# Patient Record
Sex: MALE | Race: OTHER | NOT HISPANIC OR LATINO | URBAN - METROPOLITAN AREA
[De-identification: names, ages, dates, MRNs, and addresses within clinical notes are randomized per-mention and may not be internally consistent; named-entity substitution may affect disease eponyms.]

---

## 2020-02-12 ENCOUNTER — INPATIENT (INPATIENT)
Facility: HOSPITAL | Age: 80
LOS: 6 days | Discharge: HOME | DRG: 166 | End: 2020-02-19
Attending: INTERNAL MEDICINE | Admitting: THORACIC SURGERY (CARDIOTHORACIC VASCULAR SURGERY)
Payer: MEDICARE

## 2020-02-12 VITALS
HEART RATE: 98 BPM | OXYGEN SATURATION: 96 % | DIASTOLIC BLOOD PRESSURE: 63 MMHG | SYSTOLIC BLOOD PRESSURE: 142 MMHG | RESPIRATION RATE: 18 BRPM

## 2020-02-12 DIAGNOSIS — Z95.828 PRESENCE OF OTHER VASCULAR IMPLANTS AND GRAFTS: Chronic | ICD-10-CM

## 2020-02-12 DIAGNOSIS — E11.59 TYPE 2 DIABETES MELLITUS WITH OTHER CIRCULATORY COMPLICATIONS: ICD-10-CM

## 2020-02-12 DIAGNOSIS — J43.0 UNILATERAL PULMONARY EMPHYSEMA [MACLEOD'S SYNDROME]: ICD-10-CM

## 2020-02-12 DIAGNOSIS — R91.8 OTHER NONSPECIFIC ABNORMAL FINDING OF LUNG FIELD: ICD-10-CM

## 2020-02-12 DIAGNOSIS — S88.919A COMPLETE TRAUMATIC AMPUTATION OF UNSPECIFIED LOWER LEG, LEVEL UNSPECIFIED, INITIAL ENCOUNTER: Chronic | ICD-10-CM

## 2020-02-12 DIAGNOSIS — I50.32 CHRONIC DIASTOLIC (CONGESTIVE) HEART FAILURE: ICD-10-CM

## 2020-02-12 DIAGNOSIS — I73.9 PERIPHERAL VASCULAR DISEASE, UNSPECIFIED: ICD-10-CM

## 2020-02-12 DIAGNOSIS — Z95.5 PRESENCE OF CORONARY ANGIOPLASTY IMPLANT AND GRAFT: Chronic | ICD-10-CM

## 2020-02-12 DIAGNOSIS — I10 ESSENTIAL (PRIMARY) HYPERTENSION: ICD-10-CM

## 2020-02-12 DIAGNOSIS — Z98.890 OTHER SPECIFIED POSTPROCEDURAL STATES: Chronic | ICD-10-CM

## 2020-02-12 DIAGNOSIS — I25.10 ATHEROSCLEROTIC HEART DISEASE OF NATIVE CORONARY ARTERY WITHOUT ANGINA PECTORIS: ICD-10-CM

## 2020-02-12 PROBLEM — Z00.00 ENCOUNTER FOR PREVENTIVE HEALTH EXAMINATION: Status: ACTIVE | Noted: 2020-02-12

## 2020-02-12 LAB
ALBUMIN SERPL ELPH-MCNC: 3.4 G/DL — SIGNIFICANT CHANGE UP (ref 3.3–5)
ALP SERPL-CCNC: 81 U/L — SIGNIFICANT CHANGE UP (ref 40–120)
ALT FLD-CCNC: 40 U/L — SIGNIFICANT CHANGE UP (ref 10–45)
ANION GAP SERPL CALC-SCNC: 15 MMOL/L — SIGNIFICANT CHANGE UP (ref 5–17)
APPEARANCE UR: CLEAR — SIGNIFICANT CHANGE UP
APTT BLD: 31.5 SEC — SIGNIFICANT CHANGE UP (ref 27.5–36.3)
AST SERPL-CCNC: 36 U/L — SIGNIFICANT CHANGE UP (ref 10–40)
BASE EXCESS BLDA CALC-SCNC: 3.6 MMOL/L — HIGH (ref -2–3)
BILIRUB SERPL-MCNC: 0.3 MG/DL — SIGNIFICANT CHANGE UP (ref 0.2–1.2)
BILIRUB UR-MCNC: NEGATIVE — SIGNIFICANT CHANGE UP
BLD GP AB SCN SERPL QL: NEGATIVE — SIGNIFICANT CHANGE UP
BUN SERPL-MCNC: 37 MG/DL — HIGH (ref 7–23)
CALCIUM SERPL-MCNC: 8.2 MG/DL — LOW (ref 8.4–10.5)
CHLORIDE SERPL-SCNC: 100 MMOL/L — SIGNIFICANT CHANGE UP (ref 96–108)
CHOLEST SERPL-MCNC: 97 MG/DL — SIGNIFICANT CHANGE UP (ref 10–199)
CO2 SERPL-SCNC: 26 MMOL/L — SIGNIFICANT CHANGE UP (ref 22–31)
COLOR SPEC: YELLOW — SIGNIFICANT CHANGE UP
CREAT SERPL-MCNC: 1.67 MG/DL — HIGH (ref 0.5–1.3)
DIFF PNL FLD: ABNORMAL
GLUCOSE SERPL-MCNC: 209 MG/DL — HIGH (ref 70–99)
GLUCOSE UR QL: NEGATIVE — SIGNIFICANT CHANGE UP
HBA1C BLD-MCNC: 7.6 % — HIGH (ref 4–5.6)
HCO3 BLDA-SCNC: 28 MMOL/L — SIGNIFICANT CHANGE UP (ref 21–28)
HCT VFR BLD CALC: 41 % — SIGNIFICANT CHANGE UP (ref 39–50)
HDLC SERPL-MCNC: 59 MG/DL — SIGNIFICANT CHANGE UP
HGB BLD-MCNC: 13.5 G/DL — SIGNIFICANT CHANGE UP (ref 13–17)
INR BLD: 1.08 — SIGNIFICANT CHANGE UP (ref 0.88–1.16)
KETONES UR-MCNC: ABNORMAL MG/DL
LEUKOCYTE ESTERASE UR-ACNC: NEGATIVE — SIGNIFICANT CHANGE UP
LIPID PNL WITH DIRECT LDL SERPL: 19 MG/DL — SIGNIFICANT CHANGE UP
MAGNESIUM SERPL-MCNC: 2 MG/DL — SIGNIFICANT CHANGE UP (ref 1.6–2.6)
MCHC RBC-ENTMCNC: 32.2 PG — SIGNIFICANT CHANGE UP (ref 27–34)
MCHC RBC-ENTMCNC: 32.9 GM/DL — SIGNIFICANT CHANGE UP (ref 32–36)
MCV RBC AUTO: 97.9 FL — SIGNIFICANT CHANGE UP (ref 80–100)
NITRITE UR-MCNC: NEGATIVE — SIGNIFICANT CHANGE UP
NRBC # BLD: 0 /100 WBCS — SIGNIFICANT CHANGE UP (ref 0–0)
NT-PROBNP SERPL-SCNC: 410 PG/ML — HIGH (ref 0–300)
PCO2 BLDA: 43 MMHG — SIGNIFICANT CHANGE UP (ref 35–48)
PH BLDA: 7.44 — SIGNIFICANT CHANGE UP (ref 7.35–7.45)
PH UR: 6 — SIGNIFICANT CHANGE UP (ref 5–8)
PHOSPHATE SERPL-MCNC: 2.2 MG/DL — LOW (ref 2.5–4.5)
PLATELET # BLD AUTO: 187 K/UL — SIGNIFICANT CHANGE UP (ref 150–400)
PO2 BLDA: 41 MMHG — CRITICAL LOW (ref 83–108)
POTASSIUM SERPL-MCNC: 3.4 MMOL/L — LOW (ref 3.5–5.3)
POTASSIUM SERPL-SCNC: 3.4 MMOL/L — LOW (ref 3.5–5.3)
PROT SERPL-MCNC: 7 G/DL — SIGNIFICANT CHANGE UP (ref 6–8.3)
PROT UR-MCNC: 100 MG/DL
PROTHROM AB SERPL-ACNC: 12.3 SEC — SIGNIFICANT CHANGE UP (ref 10–12.9)
RBC # BLD: 4.19 M/UL — LOW (ref 4.2–5.8)
RBC # FLD: 13.2 % — SIGNIFICANT CHANGE UP (ref 10.3–14.5)
RH IG SCN BLD-IMP: POSITIVE — SIGNIFICANT CHANGE UP
SAO2 % BLDA: 75 % — LOW (ref 95–100)
SODIUM SERPL-SCNC: 141 MMOL/L — SIGNIFICANT CHANGE UP (ref 135–145)
SP GR SPEC: 1.02 — SIGNIFICANT CHANGE UP (ref 1–1.03)
T4 AB SER-ACNC: 8.34 UG/DL — SIGNIFICANT CHANGE UP (ref 3.17–11.72)
TOTAL CHOLESTEROL/HDL RATIO MEASUREMENT: 1.6 RATIO — LOW (ref 3.4–9.6)
TRIGL SERPL-MCNC: 93 MG/DL — SIGNIFICANT CHANGE UP (ref 10–149)
TROPONIN T SERPL-MCNC: 0.05 NG/ML — CRITICAL HIGH (ref 0–0.01)
TSH SERPL-MCNC: 0.86 UIU/ML — SIGNIFICANT CHANGE UP (ref 0.35–4.94)
UROBILINOGEN FLD QL: 0.2 E.U./DL — SIGNIFICANT CHANGE UP
WBC # BLD: 10.4 K/UL — SIGNIFICANT CHANGE UP (ref 3.8–10.5)
WBC # FLD AUTO: 10.4 K/UL — SIGNIFICANT CHANGE UP (ref 3.8–10.5)

## 2020-02-12 PROCEDURE — 71045 X-RAY EXAM CHEST 1 VIEW: CPT | Mod: 26

## 2020-02-12 RX ORDER — BUDESONIDE, MICRONIZED 100 %
0.5 POWDER (GRAM) MISCELLANEOUS
Refills: 0 | Status: DISCONTINUED | OUTPATIENT
Start: 2020-02-12 | End: 2020-02-19

## 2020-02-12 RX ORDER — DEXTROSE 50 % IN WATER 50 %
12.5 SYRINGE (ML) INTRAVENOUS ONCE
Refills: 0 | Status: DISCONTINUED | OUTPATIENT
Start: 2020-02-12 | End: 2020-02-19

## 2020-02-12 RX ORDER — AMLODIPINE BESYLATE 2.5 MG/1
5 TABLET ORAL DAILY
Refills: 0 | Status: DISCONTINUED | OUTPATIENT
Start: 2020-02-12 | End: 2020-02-18

## 2020-02-12 RX ORDER — POTASSIUM CHLORIDE 20 MEQ
40 PACKET (EA) ORAL ONCE
Refills: 0 | Status: COMPLETED | OUTPATIENT
Start: 2020-02-12 | End: 2020-02-12

## 2020-02-12 RX ORDER — DEXTROSE 50 % IN WATER 50 %
25 SYRINGE (ML) INTRAVENOUS ONCE
Refills: 0 | Status: DISCONTINUED | OUTPATIENT
Start: 2020-02-12 | End: 2020-02-19

## 2020-02-12 RX ORDER — ASPIRIN/CALCIUM CARB/MAGNESIUM 324 MG
81 TABLET ORAL DAILY
Refills: 0 | Status: DISCONTINUED | OUTPATIENT
Start: 2020-02-12 | End: 2020-02-19

## 2020-02-12 RX ORDER — CLOPIDOGREL BISULFATE 75 MG/1
1 TABLET, FILM COATED ORAL
Qty: 0 | Refills: 0 | DISCHARGE

## 2020-02-12 RX ORDER — FAMOTIDINE 10 MG/ML
20 INJECTION INTRAVENOUS DAILY
Refills: 0 | Status: DISCONTINUED | OUTPATIENT
Start: 2020-02-12 | End: 2020-02-19

## 2020-02-12 RX ORDER — CHLORHEXIDINE GLUCONATE 213 G/1000ML
1 SOLUTION TOPICAL ONCE
Refills: 0 | Status: COMPLETED | OUTPATIENT
Start: 2020-02-12 | End: 2020-02-12

## 2020-02-12 RX ORDER — INSULIN LISPRO 100/ML
VIAL (ML) SUBCUTANEOUS
Refills: 0 | Status: DISCONTINUED | OUTPATIENT
Start: 2020-02-12 | End: 2020-02-19

## 2020-02-12 RX ORDER — SODIUM CHLORIDE 9 MG/ML
3 INJECTION INTRAMUSCULAR; INTRAVENOUS; SUBCUTANEOUS EVERY 8 HOURS
Refills: 0 | Status: DISCONTINUED | OUTPATIENT
Start: 2020-02-12 | End: 2020-02-19

## 2020-02-12 RX ORDER — SODIUM CHLORIDE 9 MG/ML
1000 INJECTION, SOLUTION INTRAVENOUS
Refills: 0 | Status: DISCONTINUED | OUTPATIENT
Start: 2020-02-12 | End: 2020-02-19

## 2020-02-12 RX ORDER — FAMOTIDINE 10 MG/ML
1 INJECTION INTRAVENOUS
Qty: 0 | Refills: 0 | DISCHARGE

## 2020-02-12 RX ORDER — DEXTROSE 50 % IN WATER 50 %
15 SYRINGE (ML) INTRAVENOUS ONCE
Refills: 0 | Status: DISCONTINUED | OUTPATIENT
Start: 2020-02-12 | End: 2020-02-19

## 2020-02-12 RX ORDER — GLUCAGON INJECTION, SOLUTION 0.5 MG/.1ML
1 INJECTION, SOLUTION SUBCUTANEOUS ONCE
Refills: 0 | Status: DISCONTINUED | OUTPATIENT
Start: 2020-02-12 | End: 2020-02-19

## 2020-02-12 RX ORDER — ATORVASTATIN CALCIUM 80 MG/1
20 TABLET, FILM COATED ORAL AT BEDTIME
Refills: 0 | Status: DISCONTINUED | OUTPATIENT
Start: 2020-02-12 | End: 2020-02-13

## 2020-02-12 RX ORDER — METOPROLOL TARTRATE 50 MG
12.5 TABLET ORAL EVERY 12 HOURS
Refills: 0 | Status: DISCONTINUED | OUTPATIENT
Start: 2020-02-12 | End: 2020-02-13

## 2020-02-12 RX ORDER — HEPARIN SODIUM 5000 [USP'U]/ML
5000 INJECTION INTRAVENOUS; SUBCUTANEOUS EVERY 8 HOURS
Refills: 0 | Status: DISCONTINUED | OUTPATIENT
Start: 2020-02-12 | End: 2020-02-19

## 2020-02-12 RX ORDER — CHLORHEXIDINE GLUCONATE 213 G/1000ML
1 SOLUTION TOPICAL ONCE
Refills: 0 | Status: COMPLETED | OUTPATIENT
Start: 2020-02-13 | End: 2020-02-13

## 2020-02-12 RX ORDER — NICOTINE POLACRILEX 2 MG
1 GUM BUCCAL
Qty: 0 | Refills: 0 | DISCHARGE

## 2020-02-12 RX ORDER — SODIUM CHLORIDE 9 MG/ML
500 INJECTION, SOLUTION INTRAVENOUS
Refills: 0 | Status: DISCONTINUED | OUTPATIENT
Start: 2020-02-12 | End: 2020-02-15

## 2020-02-12 RX ORDER — CHLORHEXIDINE GLUCONATE 213 G/1000ML
10 SOLUTION TOPICAL ONCE
Refills: 0 | Status: DISCONTINUED | OUTPATIENT
Start: 2020-02-13 | End: 2020-02-19

## 2020-02-12 RX ORDER — ASPIRIN/CALCIUM CARB/MAGNESIUM 324 MG
1 TABLET ORAL
Qty: 0 | Refills: 0 | DISCHARGE

## 2020-02-12 RX ORDER — ALBUTEROL 90 UG/1
2 AEROSOL, METERED ORAL EVERY 4 HOURS
Refills: 0 | Status: DISCONTINUED | OUTPATIENT
Start: 2020-02-12 | End: 2020-02-19

## 2020-02-12 RX ADMIN — Medication 0.5 MILLIGRAM(S): at 21:29

## 2020-02-12 NOTE — H&P ADULT - NSHPPHYSICALEXAM_GEN_ALL_CORE
Vital Signs Last 24 Hrs  T(C): 36.7 (12 Feb 2020 21:17), Max: 36.7 (12 Feb 2020 21:17)  T(F): 98.1 (12 Feb 2020 21:17), Max: 98.1 (12 Feb 2020 21:17)  HR: 98 (12 Feb 2020 21:28) (98 - 98)  BP: 124/61 (12 Feb 2020 21:28) (124/61 - 142/63)  BP(mean): 87 (12 Feb 2020 21:28) (87 - 90)  RR: 18 (12 Feb 2020 21:28) (18 - 18)  SpO2: 96% (12 Feb 2020 21:28) (96% - 96%)    Physical Exam  CONSTITUTIONAL:                                                              smokers cough  NEURO:                                                                       WNL                      EYES:                                                                                WNL  ENMT:                                                                               WNL  CV:                                                                                   NSR with PVC  RESPIRATORY:                                                                 severe rhonci right lung with basilar wheezing bilateral; slight use of accessory muscles on respiration  GI:                                                                                     WNL  : CASTRO + / -                                                                  WNL  MUSCULOSKELETAL:                                                       WNL  SKIN / BREAST:                                                                  surgical scar on abdomen healed  EXTREMITIES:                                                                  right above knee amputation; poor bilateral radial pulses audible in doppler; good femoral pulses

## 2020-02-12 NOTE — H&P ADULT - ASSESSMENT
80 y/o current smoker 1-2 PPD x 67 years  male PMH COPD PVD, s/p Right below knee amputation ,s/p fem/pop bypass bilateral?, diastolic CHF, CAD, PCI 2005, DMII (insulin pump) who for the last week c/o SOB. Then most recently while home experienced SOB along with chest pain. Taken to [facility] ED by ambulance where he was admitted for community acquired pneumonia. CT scan showed right sided middle lobe lung mass suspicious for malignancy and was also diagnosed with NSTEMI. Under went stress testing which was positive. Cardiac cath demonstrated Mid LAD stenosis 70%

## 2020-02-12 NOTE — H&P ADULT - NSICDXPASTSURGICALHX_GEN_ALL_CORE_FT
PAST SURGICAL HISTORY:  Amputation of leg     H/O exploratory laparotomy     H/O heart artery stent     S/P femoral-popliteal bypass surgery

## 2020-02-12 NOTE — H&P ADULT - HISTORY OF PRESENT ILLNESS
80 y/o current smoker 1-2 PPD x 67 years  male PMH COPD PVD, s/p Right below knee amputation ,s/p fem/pop bypass bilateral?, diastolic CHF, CAD, PCI 2005, DMII (insulin pump) who for the last week c/o SOB. Then most recently while home experienced SOB along with chest pain. Taken to [facility] ED by ambulance where he was admitted for community acquired pneumonia. CT scan showed right sided middle lobe lung mass suspicious for malignancy and was also diagnosed with NSTEMI. Under went stress testing which was positive. Cardiac cath demonstrated Mid LAD stenosis 70%. PT transferred to [facility] under Dr. King Saez for evaluation and management.

## 2020-02-12 NOTE — H&P ADULT - NSICDXPASTMEDICALHX_GEN_ALL_CORE_FT
PAST MEDICAL HISTORY:  CAD in native artery     Diastolic CHF, chronic     DM (diabetes mellitus)     HTN (hypertension)     Hyperlipemia     Mass of right lung     PVD (peripheral vascular disease)

## 2020-02-12 NOTE — H&P ADULT - NSHPLABSRESULTS_GEN_ALL_CORE
CT scan that accompanied pt shows right middle lobe lung mass; emphysematous lungs with small pleural effusions R>L; multiple lung nodules bilteral

## 2020-02-12 NOTE — H&P ADULT - NSHPREVIEWOFSYSTEMS_GEN_ALL_CORE
Review of Systems  CONSTITUTIONAL:  Denies Fevers / chills, sweats, fatigue, weight loss, weight gain                                      NEURO:  Denies paresthesias, seizures, syncope, confusion                                                                                EYES:  Denies Blurry vision, discharge, pain, loss of vision                                                                                    ENMT:  Denies Difficulty hearing, vertigo, dysphagia, epistaxis, recent dental work                                       CV:   pain,                                                                                         RESPIRATORY:   Wheezing, SOB,                                                              GI:  Denies Nausea, vomiting, diarrhea, constipation, melena, difficulty swallowing                                               : Denies Hematuria, dysuria, urgency, incontinence                                                                                         MUSCULOSKELETAL:  Denies arthritis, joint swelling, muscle weakness                                                             SKIN/BREAST:  Denies rash, itching, hair loss, masses                                                                                            PSYCH:  Denies depression, anxiety, suicidal ideation                                                                                               HEME/LYMPH:  Denies bruises easily, enlarged lymph nodes, tender lymph nodes                                        ENDOCRINE:  Denies cold intolerance, heat intolerance, polydipsia

## 2020-02-13 ENCOUNTER — APPOINTMENT (OUTPATIENT)
Dept: CARDIOTHORACIC SURGERY | Facility: HOSPITAL | Age: 80
End: 2020-02-13

## 2020-02-13 LAB
ANION GAP SERPL CALC-SCNC: 14 MMOL/L — SIGNIFICANT CHANGE UP (ref 5–17)
BUN SERPL-MCNC: 34 MG/DL — HIGH (ref 7–23)
CALCIUM SERPL-MCNC: 8.4 MG/DL — SIGNIFICANT CHANGE UP (ref 8.4–10.5)
CHLORIDE SERPL-SCNC: 105 MMOL/L — SIGNIFICANT CHANGE UP (ref 96–108)
CO2 SERPL-SCNC: 24 MMOL/L — SIGNIFICANT CHANGE UP (ref 22–31)
CREAT SERPL-MCNC: 1.3 MG/DL — SIGNIFICANT CHANGE UP (ref 0.5–1.3)
GLUCOSE BLDC GLUCOMTR-MCNC: 107 MG/DL — HIGH (ref 70–99)
GLUCOSE BLDC GLUCOMTR-MCNC: 144 MG/DL — HIGH (ref 70–99)
GLUCOSE BLDC GLUCOMTR-MCNC: 164 MG/DL — HIGH (ref 70–99)
GLUCOSE SERPL-MCNC: 147 MG/DL — HIGH (ref 70–99)
POTASSIUM SERPL-MCNC: 3.7 MMOL/L — SIGNIFICANT CHANGE UP (ref 3.5–5.3)
POTASSIUM SERPL-SCNC: 3.7 MMOL/L — SIGNIFICANT CHANGE UP (ref 3.5–5.3)
SODIUM SERPL-SCNC: 143 MMOL/L — SIGNIFICANT CHANGE UP (ref 135–145)

## 2020-02-13 PROCEDURE — 70553 MRI BRAIN STEM W/O & W/DYE: CPT | Mod: 26

## 2020-02-13 PROCEDURE — 93306 TTE W/DOPPLER COMPLETE: CPT | Mod: 26

## 2020-02-13 PROCEDURE — 94010 BREATHING CAPACITY TEST: CPT | Mod: 26

## 2020-02-13 PROCEDURE — 93880 EXTRACRANIAL BILAT STUDY: CPT | Mod: 26

## 2020-02-13 PROCEDURE — 78815 PET IMAGE W/CT SKULL-THIGH: CPT | Mod: 26

## 2020-02-13 RX ORDER — ATORVASTATIN CALCIUM 80 MG/1
80 TABLET, FILM COATED ORAL AT BEDTIME
Refills: 0 | Status: DISCONTINUED | OUTPATIENT
Start: 2020-02-13 | End: 2020-02-19

## 2020-02-13 RX ORDER — LIDOCAINE 4 G/100G
2 CREAM TOPICAL EVERY 24 HOURS
Refills: 0 | Status: DISCONTINUED | OUTPATIENT
Start: 2020-02-13 | End: 2020-02-19

## 2020-02-13 RX ORDER — ISOSORBIDE MONONITRATE 60 MG/1
60 TABLET, EXTENDED RELEASE ORAL DAILY
Refills: 0 | Status: DISCONTINUED | OUTPATIENT
Start: 2020-02-13 | End: 2020-02-19

## 2020-02-13 RX ORDER — METOPROLOL TARTRATE 50 MG
25 TABLET ORAL
Refills: 0 | Status: DISCONTINUED | OUTPATIENT
Start: 2020-02-13 | End: 2020-02-19

## 2020-02-13 RX ADMIN — Medication 81 MILLIGRAM(S): at 17:20

## 2020-02-13 RX ADMIN — HEPARIN SODIUM 5000 UNIT(S): 5000 INJECTION INTRAVENOUS; SUBCUTANEOUS at 23:37

## 2020-02-13 RX ADMIN — Medication 0.5 MILLIGRAM(S): at 17:20

## 2020-02-13 RX ADMIN — SODIUM CHLORIDE 50 MILLILITER(S): 9 INJECTION, SOLUTION INTRAVENOUS at 11:19

## 2020-02-13 RX ADMIN — Medication 40 MILLIEQUIVALENT(S): at 00:03

## 2020-02-13 RX ADMIN — Medication 25 MILLIGRAM(S): at 17:20

## 2020-02-13 RX ADMIN — HEPARIN SODIUM 5000 UNIT(S): 5000 INJECTION INTRAVENOUS; SUBCUTANEOUS at 06:38

## 2020-02-13 RX ADMIN — LIDOCAINE 2 PATCH: 4 CREAM TOPICAL at 18:01

## 2020-02-13 RX ADMIN — Medication 0.5 MILLIGRAM(S): at 06:38

## 2020-02-13 RX ADMIN — FAMOTIDINE 20 MILLIGRAM(S): 10 INJECTION INTRAVENOUS at 17:20

## 2020-02-13 RX ADMIN — Medication 12.5 MILLIGRAM(S): at 06:38

## 2020-02-13 RX ADMIN — AMLODIPINE BESYLATE 5 MILLIGRAM(S): 2.5 TABLET ORAL at 06:38

## 2020-02-13 RX ADMIN — ATORVASTATIN CALCIUM 80 MILLIGRAM(S): 80 TABLET, FILM COATED ORAL at 23:37

## 2020-02-13 RX ADMIN — CHLORHEXIDINE GLUCONATE 1 APPLICATION(S): 213 SOLUTION TOPICAL at 00:03

## 2020-02-13 RX ADMIN — Medication 2: at 11:52

## 2020-02-13 RX ADMIN — SODIUM CHLORIDE 3 MILLILITER(S): 9 INJECTION INTRAMUSCULAR; INTRAVENOUS; SUBCUTANEOUS at 06:39

## 2020-02-13 RX ADMIN — Medication 12.5 MILLIGRAM(S): at 00:02

## 2020-02-13 RX ADMIN — SODIUM CHLORIDE 3 MILLILITER(S): 9 INJECTION INTRAMUSCULAR; INTRAVENOUS; SUBCUTANEOUS at 22:55

## 2020-02-13 RX ADMIN — LIDOCAINE 2 PATCH: 4 CREAM TOPICAL at 19:03

## 2020-02-13 NOTE — CONSULT NOTE ADULT - ATTENDING COMMENTS
patient was not seen as he was off the floor  he is high risk for a low risk procedure increase his BB as tolerated  no cardiac contraindication for a necessary lung biopsy would restart plavix when allowable post procedure and possible mid cab  increase statin  will follow along patient was not seen as he was off the floor  he is high risk for a low risk procedure increase his BB as tolerated  no cardiac contraindication for a necessary lung biopsy would restart plavix when allowable post procedure and possible mid cab  increase statin  please obtain EKG  will follow along

## 2020-02-13 NOTE — CONSULT NOTE ADULT - SUBJECTIVE AND OBJECTIVE BOX
Patient is a 79y old  Male who presents with a chief complaint of "I had trouble breathing". (12 Feb 2020 22:21)        HPI:  80 y/o current smoker 1-2 PPD x 67 years  male PMH COPD PVD, s/p Right below knee amputation ,s/p fem/pop bypass bilateral?, diastolic CHF, CAD, PCI 2005, DMII (insulin pump) who for the last week c/o SOB. Then most recently while home experienced SOB along with chest pain. Taken to [facility] ED by ambulance where he was admitted for community acquired pneumonia. CT scan showed right sided middle lobe lung mass suspicious for malignancy and was also diagnosed with NSTEMI. Under went stress testing which was positive. Cardiac cath demonstrated Mid LAD stenosis 70%. PT transferred to [facility] under Dr. King Saez for evaluation and management. (12 Feb 2020 22:21)  Pt planed for lung biopsy prior to cardiac revascularization.    Cardiology being consulted for preoperative evaluation    PAST MEDICAL & SURGICAL HISTORY:  Hyperlipemia  Mass of right lung  Diastolic CHF, chronic  HTN (hypertension)  DM (diabetes mellitus)  PVD (peripheral vascular disease)  CAD in native artery  Amputation of leg  H/O exploratory laparotomy  S/P femoral-popliteal bypass surgery  H/O heart artery stent    FAMILY HISTORY:  No pertinent family history in first degree relatives    Social:  Allergies    No Known Allergies    Intolerances    	  MEDICATIONS:  amLODIPine   Tablet 5 milliGRAM(s) Oral daily  metoprolol tartrate 12.5 milliGRAM(s) Oral every 12 hours      ALBUTerol    90 MICROgram(s) HFA Inhaler 2 Puff(s) Inhalation every 4 hours PRN  buDESOnide    Inhalation Suspension 0.5 milliGRAM(s) Inhalation two times a day      famotidine    Tablet 20 milliGRAM(s) Oral daily    atorvastatin 20 milliGRAM(s) Oral at bedtime  dextrose 40% Gel 15 Gram(s) Oral once PRN  dextrose 50% Injectable 12.5 Gram(s) IV Push once  dextrose 50% Injectable 25 Gram(s) IV Push once  dextrose 50% Injectable 25 Gram(s) IV Push once  glucagon  Injectable 1 milliGRAM(s) IntraMuscular once PRN  insulin lispro (HumaLOG) corrective regimen sliding scale   SubCutaneous three times a day before meals    aspirin enteric coated 81 milliGRAM(s) Oral daily  chlorhexidine 0.12% Liquid 10 milliLiter(s) Swish and Spit once  dextrose 5%. 1000 milliLiter(s) IV Continuous <Continuous>  heparin  Injectable 5000 Unit(s) SubCutaneous every 8 hours  lactated ringers. 500 milliLiter(s) IV Continuous <Continuous>  sodium chloride 0.9% lock flush 3 milliLiter(s) IV Push every 8 hours            PHYSICAL EXAM:  T(C): 37.2 (02-13-20 @ 05:09), Max: 37.2 (02-13-20 @ 05:01)  HR: 78 (02-13-20 @ 08:23) (75 - 98)  BP: 143/67 (02-13-20 @ 08:23) (124/61 - 143/67)  RR: 16 (02-13-20 @ 08:23) (16 - 18)  SpO2: 93% (02-13-20 @ 08:23) (93% - 96%)  Wt(kg): --  I&O's Summary    12 Feb 2020 07:01  -  13 Feb 2020 07:00  --------------------------------------------------------  IN: 500 mL / OUT: 550 mL / NET: -50 mL    13 Feb 2020 07:01  -  13 Feb 2020 12:50  --------------------------------------------------------  IN: 0 mL / OUT: 400 mL / NET: -400 mL      Height (cm): 165.1 (02-13 @ 05:09)  Weight (kg): 73.8 (02-13 @ 05:09)  BMI (kg/m2): 27.1 (02-13 @ 05:09)  BSA (m2): 1.81 (02-13 @ 05:09)    Gen: NAD, AAOx3  Neck: no JVD  Cardiovascular: Normal S1 S2, No murmurs,    Respiratory: Lungs clear to auscultation	  Gastrointestinal:  Soft, Non-tender, + BS	   Ext: no edema  Neuro: no focal deficits.  Vascular: Peripheral pulses palpable 2+ bilaterally    TELEMETRY: 	    ECG:  	  RADIOLOGY:   DIAGNOSTIC TESTING:  [ ] Echocardiogram:  [ ]  Catheterization:  [ ] Stress Test:    OTHER: 	    LABS:	 	    CARDIAC MARKERS:                                  13.5   10.40 )-----------( 187      ( 12 Feb 2020 21:14 )             41.0     02-13    143  |  105  |  34<H>  ----------------------------<  147<H>  3.7   |  24  |  1.30    Ca    8.4      13 Feb 2020 05:51  Phos  2.2     02-12  Mg     2.0     02-12    TPro  7.0  /  Alb  3.4  /  TBili  0.3  /  DBili  x   /  AST  36  /  ALT  40  /  AlkPhos  81  02-12    proBNP: Serum Pro-Brain Natriuretic Peptide: 410 pg/mL (02-12 @ 21:14)    Lipid Profile:   HgA1c: Hemoglobin A1C, Whole Blood: 7.6 % (02-12 @ 21:14)    TSH: Thyroid Stimulating Hormone, Serum: 0.858 uIU/mL (02-12 @ 21:14)      ASSESSMENT/PLAN: Patient is a 79y old  Male who presents with a chief complaint of "I had trouble breathing". (12 Feb 2020 22:21)        HPI:  80 y/o current smoker 1-2 PPD x 67 years  male PMH COPD PVD, s/p Right below knee amputation ,s/p fem/pop bypass bilateral?, diastolic CHF, CAD, PCI 2005, DMII (insulin pump) who for the last week c/o SOB. Then most recently while home experienced SOB along with chest pain. Taken to [facility] ED by ambulance where he was admitted for community acquired pneumonia. CT scan showed right sided middle lobe lung mass suspicious for malignancy and was also diagnosed with NSTEMI. Under went stress testing which was positive. Cardiac cath demonstrated Mid LAD stenosis 70%. PT transferred to [facility] under Dr. King Saez for evaluation and management. (12 Feb 2020 22:21)  Pt planed for lung biopsy prior to cardiac revascularization.    Cardiology being consulted for preoperative evaluation    PAST MEDICAL & SURGICAL HISTORY:  Hyperlipemia  Mass of right lung  Diastolic CHF, chronic  HTN (hypertension)  DM (diabetes mellitus)  PVD (peripheral vascular disease)  CAD in native artery  Amputation of leg  H/O exploratory laparotomy  S/P femoral-popliteal bypass surgery  H/O heart artery stent    FAMILY HISTORY:  No pertinent family history in first degree relatives    Social:  Allergies    No Known Allergies    Intolerances    	  MEDICATIONS:  amLODIPine   Tablet 5 milliGRAM(s) Oral daily  metoprolol tartrate 12.5 milliGRAM(s) Oral every 12 hours  ALBUTerol    90 MICROgram(s) HFA Inhaler 2 Puff(s) Inhalation every 4 hours PRN  buDESOnide    Inhalation Suspension 0.5 milliGRAM(s) Inhalation two times a day  famotidine    Tablet 20 milliGRAM(s) Oral daily  atorvastatin 20 milliGRAM(s) Oral at bedtime  dextrose 40% Gel 15 Gram(s) Oral once PRN  dextrose 50% Injectable 12.5 Gram(s) IV Push once  dextrose 50% Injectable 25 Gram(s) IV Push once  dextrose 50% Injectable 25 Gram(s) IV Push once  glucagon  Injectable 1 milliGRAM(s) IntraMuscular once PRN  insulin lispro (HumaLOG) corrective regimen sliding scale   SubCutaneous three times a day before meals    aspirin enteric coated 81 milliGRAM(s) Oral daily  chlorhexidine 0.12% Liquid 10 milliLiter(s) Swish and Spit once  dextrose 5%. 1000 milliLiter(s) IV Continuous <Continuous>  heparin  Injectable 5000 Unit(s) SubCutaneous every 8 hours  lactated ringers. 500 milliLiter(s) IV Continuous <Continuous>  sodium chloride 0.9% lock flush 3 milliLiter(s) IV Push every 8 hours    PHYSICAL EXAM:  T(C): 37.2 (02-13-20 @ 05:09), Max: 37.2 (02-13-20 @ 05:01)  HR: 78 (02-13-20 @ 08:23) (75 - 98)  BP: 143/67 (02-13-20 @ 08:23) (124/61 - 143/67)  RR: 16 (02-13-20 @ 08:23) (16 - 18)  SpO2: 93% (02-13-20 @ 08:23) (93% - 96%)  Wt(kg): --  I&O's Summary    12 Feb 2020 07:01  -  13 Feb 2020 07:00  --------------------------------------------------------  IN: 500 mL / OUT: 550 mL / NET: -50 mL    13 Feb 2020 07:01  -  13 Feb 2020 12:50  --------------------------------------------------------  IN: 0 mL / OUT: 400 mL / NET: -400 mL      Height (cm): 165.1 (02-13 @ 05:09)  Weight (kg): 73.8 (02-13 @ 05:09)  BMI (kg/m2): 27.1 (02-13 @ 05:09)  BSA (m2): 1.81 (02-13 @ 05:09)    Gen: NAD, AAOx3  Neck: no JVD  Cardiovascular: Normal S1 S2, No murmurs,    Respiratory: Lungs clear to auscultation	  Gastrointestinal:  Soft, Non-tender, + BS	   Ext: no edema  Neuro: no focal deficits.  Vascular: Peripheral pulses palpable 2+ bilaterally    TELEMETRY: 	    ECG:  	  RADIOLOGY:   DIAGNOSTIC TESTING:  [ ] Echocardiogram:  < from: TTE Echo w/ Cont Complete (02.13.20 @ 08:53) >     1. Mildly reduced left ventricular systolic function. LVEF 47%   2. Grade I left ventricular diastolic dysfunction.   3. Normal right ventricular size and systolic function.   4. Mild mitral regurgitation.   5. No evidence of pulmonary hypertension.   6. No pericardial effusion.    < end of copied text >    [ ]  Catheterization:  [ ] Stress Test:    OTHER: 	    LABS:	 	    CARDIAC MARKERS:                                  13.5   10.40 )-----------( 187      ( 12 Feb 2020 21:14 )             41.0     02-13    143  |  105  |  34<H>  ----------------------------<  147<H>  3.7   |  24  |  1.30    Ca    8.4      13 Feb 2020 05:51  Phos  2.2     02-12  Mg     2.0     02-12    TPro  7.0  /  Alb  3.4  /  TBili  0.3  /  DBili  x   /  AST  36  /  ALT  40  /  AlkPhos  81  02-12    proBNP: Serum Pro-Brain Natriuretic Peptide: 410 pg/mL (02-12 @ 21:14)    Lipid Profile:   HgA1c: Hemoglobin A1C, Whole Blood: 7.6 % (02-12 @ 21:14)    TSH: Thyroid Stimulating Hormone, Serum: 0.858 uIU/mL (02-12 @ 21:14)      ASSESSMENT/PLAN: Patient is a 79y old  Male who presents with a chief complaint of "I had trouble breathing". (12 Feb 2020 22:21)    HPI:  80 y/o current smoker 1-2 PPD x 67 years  male PMH COPD PVD, s/p Right below knee amputation ,s/p fem/pop bypass bilateral?, diastolic CHF, CAD, PCI 2005, DMII (insulin pump) who for the last week c/o SOB. Then most recently while home experienced SOB along with chest pain. Taken to [facility] ED by ambulance where he was admitted for community acquired pneumonia. CT scan showed right sided middle lobe lung mass suspicious for malignancy and was also diagnosed with NSTEMI. Under went stress testing which was positive. Cardiac cath demonstrated Mid LAD stenosis 70%. PT transferred to [facility] under Dr. King Saez for evaluation and management. (12 Feb 2020 22:21)  Pt planed for lung biopsy prior to cardiac revascularization.    Cardiology being consulted for preoperative evaluation    PAST MEDICAL & SURGICAL HISTORY:  Hyperlipemia  Mass of right lung  Diastolic CHF, chronic  HTN (hypertension)  DM (diabetes mellitus)  PVD (peripheral vascular disease)  CAD in native artery  Amputation of leg  H/O exploratory laparotomy  S/P femoral-popliteal bypass surgery  H/O heart artery stent    FAMILY HISTORY:  No pertinent family history in first degree relatives    Social:  Allergies    No Known Allergies    Intolerances    	  MEDICATIONS:  amLODIPine   Tablet 5 milliGRAM(s) Oral daily  metoprolol tartrate 12.5 milliGRAM(s) Oral every 12 hours  ALBUTerol    90 MICROgram(s) HFA Inhaler 2 Puff(s) Inhalation every 4 hours PRN  buDESOnide    Inhalation Suspension 0.5 milliGRAM(s) Inhalation two times a day  famotidine    Tablet 20 milliGRAM(s) Oral daily  atorvastatin 20 milliGRAM(s) Oral at bedtime  dextrose 40% Gel 15 Gram(s) Oral once PRN  dextrose 50% Injectable 12.5 Gram(s) IV Push once  dextrose 50% Injectable 25 Gram(s) IV Push once  dextrose 50% Injectable 25 Gram(s) IV Push once  glucagon  Injectable 1 milliGRAM(s) IntraMuscular once PRN  insulin lispro (HumaLOG) corrective regimen sliding scale   SubCutaneous three times a day before meals    aspirin enteric coated 81 milliGRAM(s) Oral daily  chlorhexidine 0.12% Liquid 10 milliLiter(s) Swish and Spit once  dextrose 5%. 1000 milliLiter(s) IV Continuous <Continuous>  heparin  Injectable 5000 Unit(s) SubCutaneous every 8 hours  lactated ringers. 500 milliLiter(s) IV Continuous <Continuous>  sodium chloride 0.9% lock flush 3 milliLiter(s) IV Push every 8 hours    PHYSICAL EXAM:  T(C): 37.2 (02-13-20 @ 05:09), Max: 37.2 (02-13-20 @ 05:01)  HR: 78 (02-13-20 @ 08:23) (75 - 98)  BP: 143/67 (02-13-20 @ 08:23) (124/61 - 143/67)  RR: 16 (02-13-20 @ 08:23) (16 - 18)  SpO2: 93% (02-13-20 @ 08:23) (93% - 96%)  Wt(kg): --  I&O's Summary    12 Feb 2020 07:01  -  13 Feb 2020 07:00  --------------------------------------------------------  IN: 500 mL / OUT: 550 mL / NET: -50 mL    13 Feb 2020 07:01  -  13 Feb 2020 12:50  --------------------------------------------------------  IN: 0 mL / OUT: 400 mL / NET: -400 mL      Height (cm): 165.1 (02-13 @ 05:09)  Weight (kg): 73.8 (02-13 @ 05:09)  BMI (kg/m2): 27.1 (02-13 @ 05:09)  BSA (m2): 1.81 (02-13 @ 05:09)    Gen: NAD, AAOx3  Neck: no JVD  Cardiovascular: Normal S1 S2, No murmurs,    Respiratory: Lungs clear to auscultation	  Gastrointestinal:  Soft, Non-tender, + BS	   Ext: no edema, BKA  Neuro: no focal deficits.  Vascular: Peripheral pulses palpable 2+ bilaterally    TELEMETRY: 	    ECG:  	  RADIOLOGY:   DIAGNOSTIC TESTING:  [ ] Echocardiogram:  < from: TTE Echo w/ Cont Complete (02.13.20 @ 08:53) >     1. Mildly reduced left ventricular systolic function. LVEF 47%   2. Grade I left ventricular diastolic dysfunction.   3. Normal right ventricular size and systolic function.   4. Mild mitral regurgitation.   5. No evidence of pulmonary hypertension.   6. No pericardial effusion.    < end of copied text >    [ ]  Catheterization:  Mid LAD 70% stenosis  [ ] Stress Test:    OTHER: 	    LABS:	 	    CARDIAC MARKERS:                        13.5   10.40 )-----------( 187      ( 12 Feb 2020 21:14 )             41.0     02-13    143  |  105  |  34<H>  ----------------------------<  147<H>  3.7   |  24  |  1.30    Ca    8.4      13 Feb 2020 05:51  Phos  2.2     02-12  Mg     2.0     02-12    TPro  7.0  /  Alb  3.4  /  TBili  0.3  /  DBili  x   /  AST  36  /  ALT  40  /  AlkPhos  81  02-12    proBNP: Serum Pro-Brain Natriuretic Peptide: 410 pg/mL (02-12 @ 21:14)    Lipid Profile:   HgA1c: Hemoglobin A1C, Whole Blood: 7.6 % (02-12 @ 21:14)    TSH: Thyroid Stimulating Hormone, Serum: 0.858 uIU/mL (02-12 @ 21:14)      ASSESSMENT/PLAN:   79 y.o current smoker, pmh copd, pvd, R bka, fempop bypass, diastolicCHF, CAD, PCI 2005, DM2 on insulin pump presented initially with NSTEMI w/ 70% mLAD and R middle lobe lung mass  Cardiology called for preoperative evaluation:    	  Preoperative Evaluation:  - RCRI 3, 15% 30 day risk of death, MI, cardiac arrest  - Known ischemic CM that requires revascularization, however is on hold until lung mass is further evaluated.  - Pt is a High risk for Lung Biopsy  - Would recommend Anesthesia/Cardiac Anesthesia consultation prior to Lung Biopsy  - Would titrate up Metoprolol to 25mg PO BID, up from 12.5 BID  - Increase Lipitor to 80mg   - Pt currently appears euvolemic, without electrical instability or active ischemia  - Please check daily EKG, repeat Patient is a 79y old  Male who presents with a chief complaint of "I had trouble breathing". (12 Feb 2020 22:21)    HPI:  80 y/o current smoker 1-2 PPD x 67 years  male PMH COPD PVD, s/p Right below knee amputation ,s/p fem/pop bypass bilateral?, diastolic CHF, CAD, PCI 2005, DMII (insulin pump) who for the last week c/o SOB. Then most recently while home experienced SOB along with chest pain. Taken to [facility] ED by ambulance where he was admitted for community acquired pneumonia. CT scan showed right sided middle lobe lung mass suspicious for malignancy and was also diagnosed with NSTEMI. Under went stress testing which was positive. Cardiac cath demonstrated Mid LAD stenosis 70%. PT transferred to [facility] under Dr. King Saez for evaluation and management. (12 Feb 2020 22:21)  Pt planed for lung biopsy prior to cardiac revascularization.    Cardiology being consulted for preoperative evaluation    PAST MEDICAL & SURGICAL HISTORY:  Hyperlipemia  Mass of right lung  Diastolic CHF, chronic  HTN (hypertension)  DM (diabetes mellitus)  PVD (peripheral vascular disease)  CAD in native artery  Amputation of leg  H/O exploratory laparotomy  S/P femoral-popliteal bypass surgery  H/O heart artery stent    FAMILY HISTORY:  No pertinent family history in first degree relatives    Social:  Allergies    No Known Allergies    Intolerances    	  MEDICATIONS:  amLODIPine   Tablet 5 milliGRAM(s) Oral daily  metoprolol tartrate 12.5 milliGRAM(s) Oral every 12 hours  ALBUTerol    90 MICROgram(s) HFA Inhaler 2 Puff(s) Inhalation every 4 hours PRN  buDESOnide    Inhalation Suspension 0.5 milliGRAM(s) Inhalation two times a day  famotidine    Tablet 20 milliGRAM(s) Oral daily  atorvastatin 20 milliGRAM(s) Oral at bedtime  dextrose 40% Gel 15 Gram(s) Oral once PRN  dextrose 50% Injectable 12.5 Gram(s) IV Push once  dextrose 50% Injectable 25 Gram(s) IV Push once  dextrose 50% Injectable 25 Gram(s) IV Push once  glucagon  Injectable 1 milliGRAM(s) IntraMuscular once PRN  insulin lispro (HumaLOG) corrective regimen sliding scale   SubCutaneous three times a day before meals    aspirin enteric coated 81 milliGRAM(s) Oral daily  chlorhexidine 0.12% Liquid 10 milliLiter(s) Swish and Spit once  dextrose 5%. 1000 milliLiter(s) IV Continuous <Continuous>  heparin  Injectable 5000 Unit(s) SubCutaneous every 8 hours  lactated ringers. 500 milliLiter(s) IV Continuous <Continuous>  sodium chloride 0.9% lock flush 3 milliLiter(s) IV Push every 8 hours    PHYSICAL EXAM:  T(C): 37.2 (02-13-20 @ 05:09), Max: 37.2 (02-13-20 @ 05:01)  HR: 78 (02-13-20 @ 08:23) (75 - 98)  BP: 143/67 (02-13-20 @ 08:23) (124/61 - 143/67)  RR: 16 (02-13-20 @ 08:23) (16 - 18)  SpO2: 93% (02-13-20 @ 08:23) (93% - 96%)  Wt(kg): --  I&O's Summary    12 Feb 2020 07:01  -  13 Feb 2020 07:00  --------------------------------------------------------  IN: 500 mL / OUT: 550 mL / NET: -50 mL    13 Feb 2020 07:01  -  13 Feb 2020 12:50  --------------------------------------------------------  IN: 0 mL / OUT: 400 mL / NET: -400 mL      Height (cm): 165.1 (02-13 @ 05:09)  Weight (kg): 73.8 (02-13 @ 05:09)  BMI (kg/m2): 27.1 (02-13 @ 05:09)  BSA (m2): 1.81 (02-13 @ 05:09)    Gen: NAD, AAOx3  Neck: no JVD  Cardiovascular: Normal S1 S2, No murmurs,    Respiratory: Lungs clear to auscultation	  Gastrointestinal:  Soft, Non-tender, + BS	   Ext: no edema, BKA  Neuro: no focal deficits.  Vascular: Peripheral pulses palpable 2+ bilaterally    TELEMETRY: 	    ECG:  	  RADIOLOGY:   DIAGNOSTIC TESTING:  [ ] Echocardiogram:  < from: TTE Echo w/ Cont Complete (02.13.20 @ 08:53) >     1. Mildly reduced left ventricular systolic function. LVEF 47%   2. Grade I left ventricular diastolic dysfunction.   3. Normal right ventricular size and systolic function.   4. Mild mitral regurgitation.   5. No evidence of pulmonary hypertension.   6. No pericardial effusion.    < end of copied text >    [ ]  Catheterization:  Mid LAD 70% stenosis  [ ] Stress Test:    OTHER: 	    LABS:	 	    CARDIAC MARKERS:                        13.5   10.40 )-----------( 187      ( 12 Feb 2020 21:14 )             41.0     02-13    143  |  105  |  34<H>  ----------------------------<  147<H>  3.7   |  24  |  1.30    Ca    8.4      13 Feb 2020 05:51  Phos  2.2     02-12  Mg     2.0     02-12    TPro  7.0  /  Alb  3.4  /  TBili  0.3  /  DBili  x   /  AST  36  /  ALT  40  /  AlkPhos  81  02-12    proBNP: Serum Pro-Brain Natriuretic Peptide: 410 pg/mL (02-12 @ 21:14)    Lipid Profile:   HgA1c: Hemoglobin A1C, Whole Blood: 7.6 % (02-12 @ 21:14)    TSH: Thyroid Stimulating Hormone, Serum: 0.858 uIU/mL (02-12 @ 21:14)      ASSESSMENT/PLAN:   79 y.o current smoker, pmh copd, pvd, R bka, fempop bypass, diastolicCHF, CAD, PCI 2005, DM2 on insulin pump presented initially with NSTEMI w/ 70% mLAD and R middle lobe lung mass  Cardiology called for preoperative evaluation:    	  Preoperative Evaluation:  - RCRI 3, 15% 30 day risk of death, MI, cardiac arrest  - Known ischemic CM that requires revascularization, however is on hold until lung mass is further evaluated.  - Pt is a High risk for Lung Biopsy which is a low risk procedure  - Would titrate up Metoprolol to 25mg PO BID, up from 12.5 BID  - Increase Lipitor to 80mg   - Pt currently appears euvolemic, without electrical instability or active ischemia  - Please check daily EKG, repeat

## 2020-02-13 NOTE — PROGRESS NOTE ADULT - SUBJECTIVE AND OBJECTIVE BOX
Patient discussed on morning rounds with Dr. Saez    Operation / Date: OR plan pending    SUBJECTIVE ASSESSMENT:  79y Male assessed at bedside today. The patient states he feels well. He denies chest pain since last Thursday. He denies SOB, fever, chills, nausea, vomiting.     Vital Signs Last 24 Hrs  T(C): 37.2 (2020 05:09), Max: 37.2 (2020 05:01)  T(F): 99 (2020 05:09), Max: 99 (2020 05:01)  HR: 78 (2020 08:23) (75 - 98)  BP: 143/67 (2020 08:23) (124/61 - 143/67)  BP(mean): 97 (2020 08:23) (87 - 97)  RR: 16 (2020 08:23) (16 - 18)  SpO2: 93% (2020 08:23) (93% - 96%)  I&O's Detail    2020 07:01  -  2020 07:00  --------------------------------------------------------  IN:    lactated ringers.: 500 mL  Total IN: 500 mL    OUT:    Voided: 550 mL  Total OUT: 550 mL    Total NET: -50 mL    2020 07:01  -  2020 15:33  --------------------------------------------------------  IN:  Total IN: 0 mL    OUT:    Voided: 400 mL  Total OUT: 400 mL    Total NET: -400 mL    CHEST TUBE: No.   MAX DRAIN:  No.  EPICARDIAL WIRES: No.  TIE DOWNS: No.  CASTRO: No.    PHYSICAL EXAM:  General: Well appearing, in NAD assessed laying comfortably in bed  Neurological: A&OX3, no focal deficits noted. Moving bilateral upper and lower extremities.   Cardiovascular: RRR, no murmurs, rubs, gallops  Respiratory: Clear to auscultation bilateral posterior lung fields. No wheezes, rales, rhonchi.  Gastrointestinal: +BS, NT/ND  Extremities: No peripheral edema, no calf tenderness. Full strength and ROM bilateral upper and lower extremities    Vascular: Bilateral distal pulses 2+  Incision Sites: None      LABS:                        13.5   10.40 )-----------( 187      ( 2020 21:14 )             41.0       COUMADIN:  No    PT/INR - ( 2020 21:14 )   PT: 12.3 sec;   INR: 1.08          PTT - ( 2020 21:14 )  PTT:31.5 sec        143  |  105  |  34<H>  ----------------------------<  147<H>  3.7   |  24  |  1.30    Ca    8.4      2020 05:51  Phos  2.2     -  Mg     2.0         TPro  7.0  /  Alb  3.4  /  TBili  0.3  /  DBili  x   /  AST  36  /  ALT  40  /  AlkPhos  81  02-12      Urinalysis Basic - ( 2020 21:54 )    Color: Yellow / Appearance: Clear / S.020 / pH: x  Gluc: x / Ketone: Trace mg/dL  / Bili: Negative / Urobili: 0.2 E.U./dL   Blood: x / Protein: 100 mg/dL / Nitrite: NEGATIVE   Leuk Esterase: NEGATIVE / RBC: 5-10 /HPF / WBC < 5 /HPF   Sq Epi: x / Non Sq Epi: 0-5 /HPF / Bacteria: Present /HPF        MEDICATIONS  (STANDING):  amLODIPine   Tablet 5 milliGRAM(s) Oral daily  aspirin enteric coated 81 milliGRAM(s) Oral daily  atorvastatin 80 milliGRAM(s) Oral at bedtime  buDESOnide    Inhalation Suspension 0.5 milliGRAM(s) Inhalation two times a day  chlorhexidine 0.12% Liquid 10 milliLiter(s) Swish and Spit once  dextrose 5%. 1000 milliLiter(s) (50 mL/Hr) IV Continuous <Continuous>  dextrose 50% Injectable 12.5 Gram(s) IV Push once  dextrose 50% Injectable 25 Gram(s) IV Push once  dextrose 50% Injectable 25 Gram(s) IV Push once  famotidine    Tablet 20 milliGRAM(s) Oral daily  heparin  Injectable 5000 Unit(s) SubCutaneous every 8 hours  insulin lispro (HumaLOG) corrective regimen sliding scale   SubCutaneous three times a day before meals  lactated ringers. 500 milliLiter(s) (50 mL/Hr) IV Continuous <Continuous>  metoprolol tartrate 25 milliGRAM(s) Oral two times a day  sodium chloride 0.9% lock flush 3 milliLiter(s) IV Push every 8 hours    MEDICATIONS  (PRN):  ALBUTerol    90 MICROgram(s) HFA Inhaler 2 Puff(s) Inhalation every 4 hours PRN Shortness of Breath and/or Wheezing  dextrose 40% Gel 15 Gram(s) Oral once PRN Blood Glucose LESS THAN 70 milliGRAM(s)/deciliter  glucagon  Injectable 1 milliGRAM(s) IntraMuscular once PRN Glucose LESS THAN 70 milligrams/deciliter    RADIOLOGY & ADDITIONAL TESTS:  < from: Xray Chest 1 View- PORTABLE-Routine (20 @ 20:35) >  Impression:    Focal atelectasis/infiltrate right upper lobe has improved compared to prior outside exam from [facility]. Scattered increased lung markings are present and suspicious for pulmonary vascular congestion. No pleural effusion. No pneumothorax. Mild hypoinflation. No acute bone abnormality.    < from: TTE Echo w/ Cont Complete (20 @ 08:53) >  CONCLUSIONS:     1. Mildly reduced left ventricular systolic function.   2. Grade I left ventricular diastolic dysfunction.   3. Normal right ventricular size and systolic function.   4. Mild mitral regurgitation.   5. No evidence of pulmonary hypertension.   6. No pericardial effusion.    < from: US Duplex Carotid Arteries Complete, Bilateral (20 @ 10:32) >  IMPRESSION:  No hemodynamically significant carotid artery stenosis.   Moderate calcified plaque bilaterally.    A/P:    Mr. Richards is an 79 y.o current smoker (1-2 PPD X67 years) male with a PMHx of COPD    Neurovascular:   - No delirium. Pain well controlled with current regimen.  -Continue tylenol PRN    Cardiovascular:   - POD_ s/p _  -Hemodynamically stable. HR controlled (X-X)  - Hx of HTN, BP controlled (X-X)  - ASA, Plavix, BB, statin  - EKG. TTE. Cardiac Panel. Lipid Panel. BNP.      Respiratory:   - 02 Sat = 98% on RA.  -If on oxygen wean to RA from for O2 Sat > 93%.  -Encourage C+DB and Use of IS 10x / hr while awake.  -CXR.    GI:   - Stable.  -NPO after MN.  -Continue GI PPX with protonix  -PO Diet.    Renal / :  - BUN/Cr stable at X/X  -Continue to monitor renal function.  -Monitor I/O's.  - Replete electrolytes PRN    Endocrine:    -A1c.  -TSH.    Hematologic:  -H/H stable at X/X with no obvious signs of bleeding  -Coagulation Panel.    ID:  -Pt remains afebrile with no elevation in WBC or signs of infection  -Continue to monitor CBC  -Observe for SIRS/Sepsis Syndrome.    Prophylaxis:  -DVT prophylaxis with 5000 SubQ Heparin q8h.  -SCD's    Disposition:  -Home when medically appropriate. Patient discussed on morning rounds with Dr. Saez    Operation / Date: OR plan pending    SUBJECTIVE ASSESSMENT:  79y Male assessed at bedside today. The patient states he feels well. He denies chest pain since last Thursday. He denies SOB, fever, chills, nausea, vomiting.     Vital Signs Last 24 Hrs  T(C): 37.2 (2020 05:09), Max: 37.2 (2020 05:01)  T(F): 99 (2020 05:09), Max: 99 (2020 05:01)  HR: 78 (2020 08:23) (75 - 98)  BP: 143/67 (2020 08:23) (124/61 - 143/67)  BP(mean): 97 (2020 08:23) (87 - 97)  RR: 16 (2020 08:23) (16 - 18)  SpO2: 93% (2020 08:23) (93% - 96%)  I&O's Detail    2020 07:01  -  2020 07:00  --------------------------------------------------------  IN:    lactated ringers.: 500 mL  Total IN: 500 mL    OUT:    Voided: 550 mL  Total OUT: 550 mL    Total NET: -50 mL    2020 07:01  -  2020 15:33  --------------------------------------------------------  IN:  Total IN: 0 mL    OUT:    Voided: 400 mL  Total OUT: 400 mL    Total NET: -400 mL    CHEST TUBE: No.   MAX DRAIN:  No.  EPICARDIAL WIRES: No.  TIE DOWNS: No.  CASTRO: No.    PHYSICAL EXAM:  General: Well appearing, in NAD assessed laying comfortably in bed  Neurological: A&OX3, no focal deficits noted. Moving bilateral upper and lower extremities.   Cardiovascular: RRR, no murmurs, rubs, gallops  Respiratory: Clear to auscultation bilateral posterior lung fields. No wheezes, rales, rhonchi.  Gastrointestinal: +BS, NT/ND  Extremities: No peripheral edema, no calf tenderness. Full strength and ROM bilateral upper and lower extremities. Right sided BKA    Vascular: Bilateral distal pulses 2+  Incision Sites: None      LABS:                        13.5   10.40 )-----------( 187      ( 2020 21:14 )             41.0       COUMADIN:  No    PT/INR - ( 2020 21:14 )   PT: 12.3 sec;   INR: 1.08          PTT - ( 2020 21:14 )  PTT:31.5 sec        143  |  105  |  34<H>  ----------------------------<  147<H>  3.7   |  24  |  1.30    Ca    8.4      2020 05:51  Phos  2.2       Mg     2.0         TPro  7.0  /  Alb  3.4  /  TBili  0.3  /  DBili  x   /  AST  36  /  ALT  40  /  AlkPhos  81  02-12      Urinalysis Basic - ( 2020 21:54 )    Color: Yellow / Appearance: Clear / S.020 / pH: x  Gluc: x / Ketone: Trace mg/dL  / Bili: Negative / Urobili: 0.2 E.U./dL   Blood: x / Protein: 100 mg/dL / Nitrite: NEGATIVE   Leuk Esterase: NEGATIVE / RBC: 5-10 /HPF / WBC < 5 /HPF   Sq Epi: x / Non Sq Epi: 0-5 /HPF / Bacteria: Present /HPF        MEDICATIONS  (STANDING):  amLODIPine   Tablet 5 milliGRAM(s) Oral daily  aspirin enteric coated 81 milliGRAM(s) Oral daily  atorvastatin 80 milliGRAM(s) Oral at bedtime  buDESOnide    Inhalation Suspension 0.5 milliGRAM(s) Inhalation two times a day  chlorhexidine 0.12% Liquid 10 milliLiter(s) Swish and Spit once  dextrose 5%. 1000 milliLiter(s) (50 mL/Hr) IV Continuous <Continuous>  dextrose 50% Injectable 12.5 Gram(s) IV Push once  dextrose 50% Injectable 25 Gram(s) IV Push once  dextrose 50% Injectable 25 Gram(s) IV Push once  famotidine    Tablet 20 milliGRAM(s) Oral daily  heparin  Injectable 5000 Unit(s) SubCutaneous every 8 hours  insulin lispro (HumaLOG) corrective regimen sliding scale   SubCutaneous three times a day before meals  lactated ringers. 500 milliLiter(s) (50 mL/Hr) IV Continuous <Continuous>  metoprolol tartrate 25 milliGRAM(s) Oral two times a day  sodium chloride 0.9% lock flush 3 milliLiter(s) IV Push every 8 hours    MEDICATIONS  (PRN):  ALBUTerol    90 MICROgram(s) HFA Inhaler 2 Puff(s) Inhalation every 4 hours PRN Shortness of Breath and/or Wheezing  dextrose 40% Gel 15 Gram(s) Oral once PRN Blood Glucose LESS THAN 70 milliGRAM(s)/deciliter  glucagon  Injectable 1 milliGRAM(s) IntraMuscular once PRN Glucose LESS THAN 70 milligrams/deciliter    RADIOLOGY & ADDITIONAL TESTS:  < from: Xray Chest 1 View- PORTABLE-Routine (20 @ 20:35) >  Impression:    Focal atelectasis/infiltrate right upper lobe has improved compared to prior outside exam from [facility]. Scattered increased lung markings are present and suspicious for pulmonary vascular congestion. No pleural effusion. No pneumothorax. Mild hypoinflation. No acute bone abnormality.    < from: TTE Echo w/ Cont Complete (20 @ 08:53) >  CONCLUSIONS:     1. Mildly reduced left ventricular systolic function.   2. Grade I left ventricular diastolic dysfunction.   3. Normal right ventricular size and systolic function.   4. Mild mitral regurgitation.   5. No evidence of pulmonary hypertension.   6. No pericardial effusion.    < from: US Duplex Carotid Arteries Complete, Bilateral (20 @ 10:32) >  IMPRESSION:  No hemodynamically significant carotid artery stenosis.   Moderate calcified plaque bilaterally.    A/P:    Mr. Richards is an 79 y.o current smoker (1-2 PPD X67 years) male with a PMHx of COPD, PVD s/p right BKA, s/p fem/popliteal bilateral bypass, diastolic CHF, CAD, PCI , DMII (insulin pump) who was taken via ambulance to [facility] ED with SOB and chest pain and was admitted for community acquired pneumonia. A CT scan completed there showed right sided middle lobe lung mass suspicious for malignancy and upon further workup he was diagnosed with an NSTEMI. Stress testing was positive and cardiac catheterization demonstrated mid LAD stenosis (70%). The patient was transferred to [facility] under the care of Dr. Saez for evaluation and management. Cardiac intervention is now pending further workup of lung mass.     Neurovascular:   - No delirium. Pain well controlled with current regimen.  - No indication for pain medication at this time    Cardiovascular:   - Admitted under care for Dr. Saez with plan for MIDCAB this morning, cancelled pending further workup of lung mass  - Cardiology consulted for high risk anesthesia, recommend BB 25 metoprolol BID and increasing atorvastatin to 80 mg. Will continue to appreciate recs  -Hemodynamically stable. HR controlled (75-98)  - Hx of HTN, BP controlled (124//65). Continue metoprolol 25 BID, amlodipine 5 mg daily  - Hx of CAD, continue to hold plavix pending intervention, continue with ASA 81   - Continue to monitor EKG    Respiratory:   - Undergoing workup for right lung mass found on chest CT: follow up results of brain MRI, PET CT  - Cardiology and cardiac anesthesia following for high risk general anesthesia, will appreciate recs   - 02 Sat = 94% on 2L NC  -Encourage C+DB and Use of IS 10x / hr while awake.  - Continue to monitor CXR     GI:   - Stable.  -Continue GI PPX with protonix  -PO Diet to resume after PET CT     Renal / :  - BUN/Cr stable at 34/1.30  -Continue to monitor renal function.  -Monitor I/O's.  - Replete electrolytes PRN    Endocrine:    -A1c 7.6, continue insulin sliding scale   -TSH 0.858, T4 8.34, no known hx of thyroid disease    Hematologic:  -H/H stable at 13.5/41.0 with no obvious signs of bleeding  -Coagulation Panel.    ID:  -Pt remains afebrile with no elevation in WBC or signs of infection  -Continue to monitor CBC  -Observe for SIRS/Sepsis Syndrome.    Prophylaxis:  -DVT prophylaxis with 5000 SubQ Heparin q8h.  -SCD's    Disposition:  -Floor for continued workup and management planning Patient discussed on morning rounds with Dr. Saez    Operation / Date: OR plan pending    SUBJECTIVE ASSESSMENT:  79y Male assessed at bedside today. The patient states he feels well. He denies chest pain since last Thursday. He denies SOB, fever, chills, nausea, vomiting.     Vital Signs Last 24 Hrs  T(C): 37.2 (2020 05:09), Max: 37.2 (2020 05:01)  T(F): 99 (2020 05:09), Max: 99 (2020 05:01)  HR: 78 (2020 08:23) (75 - 98)  BP: 143/67 (2020 08:23) (124/61 - 143/67)  BP(mean): 97 (2020 08:23) (87 - 97)  RR: 16 (2020 08:23) (16 - 18)  SpO2: 93% (2020 08:23) (93% - 96%)  I&O's Detail    2020 07:01  -  2020 07:00  --------------------------------------------------------  IN:    lactated ringers.: 500 mL  Total IN: 500 mL    OUT:    Voided: 550 mL  Total OUT: 550 mL    Total NET: -50 mL    2020 07:01  -  2020 15:33  --------------------------------------------------------  IN:  Total IN: 0 mL    OUT:    Voided: 400 mL  Total OUT: 400 mL    Total NET: -400 mL    CHEST TUBE: No.   MAX DRAIN:  No.  EPICARDIAL WIRES: No.  TIE DOWNS: No.  CASTRO: No.    PHYSICAL EXAM:  General: Well appearing, in NAD assessed laying comfortably in bed  Neurological: A&OX3, no focal deficits noted. Moving bilateral upper and lower extremities.   Cardiovascular: RRR, no murmurs, rubs, gallops  Respiratory: Clear to auscultation bilateral posterior lung fields. No wheezes, rales, rhonchi.  Gastrointestinal: +BS, NT/ND  Extremities: No peripheral edema, no calf tenderness. Full strength and ROM bilateral upper and lower extremities. Right sided BKA    Vascular: Bilateral distal pulses 2+  Incision Sites: None      LABS:                        13.5   10.40 )-----------( 187      ( 2020 21:14 )             41.0       COUMADIN:  No    PT/INR - ( 2020 21:14 )   PT: 12.3 sec;   INR: 1.08          PTT - ( 2020 21:14 )  PTT:31.5 sec        143  |  105  |  34<H>  ----------------------------<  147<H>  3.7   |  24  |  1.30    Ca    8.4      2020 05:51  Phos  2.2       Mg     2.0         TPro  7.0  /  Alb  3.4  /  TBili  0.3  /  DBili  x   /  AST  36  /  ALT  40  /  AlkPhos  81  02-12      Urinalysis Basic - ( 2020 21:54 )    Color: Yellow / Appearance: Clear / S.020 / pH: x  Gluc: x / Ketone: Trace mg/dL  / Bili: Negative / Urobili: 0.2 E.U./dL   Blood: x / Protein: 100 mg/dL / Nitrite: NEGATIVE   Leuk Esterase: NEGATIVE / RBC: 5-10 /HPF / WBC < 5 /HPF   Sq Epi: x / Non Sq Epi: 0-5 /HPF / Bacteria: Present /HPF        MEDICATIONS  (STANDING):  amLODIPine   Tablet 5 milliGRAM(s) Oral daily  aspirin enteric coated 81 milliGRAM(s) Oral daily  atorvastatin 80 milliGRAM(s) Oral at bedtime  buDESOnide    Inhalation Suspension 0.5 milliGRAM(s) Inhalation two times a day  chlorhexidine 0.12% Liquid 10 milliLiter(s) Swish and Spit once  dextrose 5%. 1000 milliLiter(s) (50 mL/Hr) IV Continuous <Continuous>  dextrose 50% Injectable 12.5 Gram(s) IV Push once  dextrose 50% Injectable 25 Gram(s) IV Push once  dextrose 50% Injectable 25 Gram(s) IV Push once  famotidine    Tablet 20 milliGRAM(s) Oral daily  heparin  Injectable 5000 Unit(s) SubCutaneous every 8 hours  insulin lispro (HumaLOG) corrective regimen sliding scale   SubCutaneous three times a day before meals  lactated ringers. 500 milliLiter(s) (50 mL/Hr) IV Continuous <Continuous>  metoprolol tartrate 25 milliGRAM(s) Oral two times a day  sodium chloride 0.9% lock flush 3 milliLiter(s) IV Push every 8 hours    MEDICATIONS  (PRN):  ALBUTerol    90 MICROgram(s) HFA Inhaler 2 Puff(s) Inhalation every 4 hours PRN Shortness of Breath and/or Wheezing  dextrose 40% Gel 15 Gram(s) Oral once PRN Blood Glucose LESS THAN 70 milliGRAM(s)/deciliter  glucagon  Injectable 1 milliGRAM(s) IntraMuscular once PRN Glucose LESS THAN 70 milligrams/deciliter    RADIOLOGY & ADDITIONAL TESTS:  < from: Xray Chest 1 View- PORTABLE-Routine (20 @ 20:35) >  Impression:    Focal atelectasis/infiltrate right upper lobe has improved compared to prior outside exam from [facility]. Scattered increased lung markings are present and suspicious for pulmonary vascular congestion. No pleural effusion. No pneumothorax. Mild hypoinflation. No acute bone abnormality.    < from: TTE Echo w/ Cont Complete (20 @ 08:53) >  CONCLUSIONS:     1. Mildly reduced left ventricular systolic function.   2. Grade I left ventricular diastolic dysfunction.   3. Normal right ventricular size and systolic function.   4. Mild mitral regurgitation.   5. No evidence of pulmonary hypertension.   6. No pericardial effusion.    < from: US Duplex Carotid Arteries Complete, Bilateral (20 @ 10:32) >  IMPRESSION:  No hemodynamically significant carotid artery stenosis.   Moderate calcified plaque bilaterally.    A/P:    Mr. Richards is an 79 y.o current smoker (1-2 PPD X67 years) male with a PMHx of COPD, PVD s/p right BKA, s/p fem/popliteal bilateral bypass, diastolic CHF, CAD, PCI , DMII (insulin pump) who was taken via ambulance to [facility] ED with SOB and chest pain and was admitted for community acquired pneumonia. A CT scan completed there showed right sided middle lobe lung mass suspicious for malignancy and upon further workup he was diagnosed with an NSTEMI. Stress testing was positive and cardiac catheterization demonstrated mid LAD stenosis (70%). The patient was transferred to [facility] under the care of Dr. Saez for evaluation and management. Cardiac intervention is now pending further workup of lung mass.     Neurovascular:   - No delirium. Pain well controlled with current regimen.  - No indication for pain medication at this time    Cardiovascular:   - Admitted under care for Dr. Saez with plan for MIDCAB this morning, cancelled pending further workup of lung mass  - Cardiology consulted for high risk anesthesia, recommend BB 25 metoprolol BID and increasing atorvastatin to 80 mg. Will continue to appreciate recs  -Hemodynamically stable. HR controlled (75-98)  - Hx of HTN, BP controlled (124//65). Continue metoprolol 25 BID, amlodipine 5 mg daily  - Hx of CAD, continue to hold plavix pending intervention, continue with ASA 81   - Continue to monitor EKG    Respiratory:   - Undergoing workup for right lung mass found on chest CT: follow up results of brain MRI, PET CT  - Cardiology and cardiac anesthesia following for high risk general anesthesia, will appreciate recs   - Tentative plan for IP biopsy of lung mass Tuesday, follow up plan  -  Sat = 94% on 2L NC  -Encourage C+DB and Use of IS 10x / hr while awake.  - Continue to monitor CXR     GI:   - Stable.  -Continue GI PPX with protonix  -PO Diet to resume after PET CT     Renal / :  - BUN/Cr stable at 34/1.30  -Continue to monitor renal function.  -Monitor I/O's.  - Replete electrolytes PRN    Endocrine:    -A1c 7.6, continue insulin sliding scale   -TSH 0.858, T4 8.34, no known hx of thyroid disease    Hematologic:  -H/H stable at 13.5/41.0 with no obvious signs of bleeding  -Coagulation Panel.    ID:  -Pt remains afebrile with no elevation in WBC or signs of infection  -Continue to monitor CBC  -Observe for SIRS/Sepsis Syndrome.    Prophylaxis:  -DVT prophylaxis with 5000 SubQ Heparin q8h.  -SCD's    Disposition:  -Floor for continued workup and management planning

## 2020-02-14 DIAGNOSIS — I21.4 NON-ST ELEVATION (NSTEMI) MYOCARDIAL INFARCTION: ICD-10-CM

## 2020-02-14 DIAGNOSIS — J44.9 CHRONIC OBSTRUCTIVE PULMONARY DISEASE, UNSPECIFIED: ICD-10-CM

## 2020-02-14 DIAGNOSIS — R00.1 BRADYCARDIA, UNSPECIFIED: ICD-10-CM

## 2020-02-14 LAB
ANION GAP SERPL CALC-SCNC: 13 MMOL/L — SIGNIFICANT CHANGE UP (ref 5–17)
BUN SERPL-MCNC: 22 MG/DL — SIGNIFICANT CHANGE UP (ref 7–23)
CALCIUM SERPL-MCNC: 8.6 MG/DL — SIGNIFICANT CHANGE UP (ref 8.4–10.5)
CHLORIDE SERPL-SCNC: 105 MMOL/L — SIGNIFICANT CHANGE UP (ref 96–108)
CO2 SERPL-SCNC: 24 MMOL/L — SIGNIFICANT CHANGE UP (ref 22–31)
CREAT SERPL-MCNC: 0.97 MG/DL — SIGNIFICANT CHANGE UP (ref 0.5–1.3)
GLUCOSE BLDC GLUCOMTR-MCNC: 117 MG/DL — HIGH (ref 70–99)
GLUCOSE BLDC GLUCOMTR-MCNC: 133 MG/DL — HIGH (ref 70–99)
GLUCOSE BLDC GLUCOMTR-MCNC: 163 MG/DL — HIGH (ref 70–99)
GLUCOSE BLDC GLUCOMTR-MCNC: 223 MG/DL — HIGH (ref 70–99)
GLUCOSE SERPL-MCNC: 112 MG/DL — HIGH (ref 70–99)
HCT VFR BLD CALC: 43.5 % — SIGNIFICANT CHANGE UP (ref 39–50)
HGB BLD-MCNC: 14 G/DL — SIGNIFICANT CHANGE UP (ref 13–17)
MAGNESIUM SERPL-MCNC: 2 MG/DL — SIGNIFICANT CHANGE UP (ref 1.6–2.6)
MCHC RBC-ENTMCNC: 32 PG — SIGNIFICANT CHANGE UP (ref 27–34)
MCHC RBC-ENTMCNC: 32.2 GM/DL — SIGNIFICANT CHANGE UP (ref 32–36)
MCV RBC AUTO: 99.5 FL — SIGNIFICANT CHANGE UP (ref 80–100)
NRBC # BLD: 0 /100 WBCS — SIGNIFICANT CHANGE UP (ref 0–0)
PHOSPHATE SERPL-MCNC: 2.6 MG/DL — SIGNIFICANT CHANGE UP (ref 2.5–4.5)
PLATELET # BLD AUTO: 178 K/UL — SIGNIFICANT CHANGE UP (ref 150–400)
POTASSIUM SERPL-MCNC: 4.9 MMOL/L — SIGNIFICANT CHANGE UP (ref 3.5–5.3)
POTASSIUM SERPL-SCNC: 4.9 MMOL/L — SIGNIFICANT CHANGE UP (ref 3.5–5.3)
RBC # BLD: 4.37 M/UL — SIGNIFICANT CHANGE UP (ref 4.2–5.8)
RBC # FLD: 13 % — SIGNIFICANT CHANGE UP (ref 10.3–14.5)
SODIUM SERPL-SCNC: 142 MMOL/L — SIGNIFICANT CHANGE UP (ref 135–145)
WBC # BLD: 8.48 K/UL — SIGNIFICANT CHANGE UP (ref 3.8–10.5)
WBC # FLD AUTO: 8.48 K/UL — SIGNIFICANT CHANGE UP (ref 3.8–10.5)

## 2020-02-14 PROCEDURE — 99223 1ST HOSP IP/OBS HIGH 75: CPT

## 2020-02-14 PROCEDURE — 99222 1ST HOSP IP/OBS MODERATE 55: CPT

## 2020-02-14 RX ADMIN — Medication 81 MILLIGRAM(S): at 11:58

## 2020-02-14 RX ADMIN — ISOSORBIDE MONONITRATE 60 MILLIGRAM(S): 60 TABLET, EXTENDED RELEASE ORAL at 11:59

## 2020-02-14 RX ADMIN — FAMOTIDINE 20 MILLIGRAM(S): 10 INJECTION INTRAVENOUS at 11:58

## 2020-02-14 RX ADMIN — ATORVASTATIN CALCIUM 80 MILLIGRAM(S): 80 TABLET, FILM COATED ORAL at 22:17

## 2020-02-14 RX ADMIN — Medication 0.5 MILLIGRAM(S): at 17:31

## 2020-02-14 RX ADMIN — SODIUM CHLORIDE 3 MILLILITER(S): 9 INJECTION INTRAMUSCULAR; INTRAVENOUS; SUBCUTANEOUS at 13:20

## 2020-02-14 RX ADMIN — Medication 4: at 11:58

## 2020-02-14 RX ADMIN — SODIUM CHLORIDE 3 MILLILITER(S): 9 INJECTION INTRAMUSCULAR; INTRAVENOUS; SUBCUTANEOUS at 22:19

## 2020-02-14 RX ADMIN — HEPARIN SODIUM 5000 UNIT(S): 5000 INJECTION INTRAVENOUS; SUBCUTANEOUS at 22:16

## 2020-02-14 RX ADMIN — LIDOCAINE 2 PATCH: 4 CREAM TOPICAL at 22:17

## 2020-02-14 RX ADMIN — SODIUM CHLORIDE 3 MILLILITER(S): 9 INJECTION INTRAMUSCULAR; INTRAVENOUS; SUBCUTANEOUS at 06:29

## 2020-02-14 RX ADMIN — Medication 0.5 MILLIGRAM(S): at 06:43

## 2020-02-14 RX ADMIN — HEPARIN SODIUM 5000 UNIT(S): 5000 INJECTION INTRAVENOUS; SUBCUTANEOUS at 06:42

## 2020-02-14 RX ADMIN — HEPARIN SODIUM 5000 UNIT(S): 5000 INJECTION INTRAVENOUS; SUBCUTANEOUS at 13:20

## 2020-02-14 RX ADMIN — AMLODIPINE BESYLATE 5 MILLIGRAM(S): 2.5 TABLET ORAL at 06:43

## 2020-02-14 RX ADMIN — Medication 25 MILLIGRAM(S): at 17:31

## 2020-02-14 NOTE — CONSULT NOTE ADULT - SUBJECTIVE AND OBJECTIVE BOX
Surgeon:    Requesting Physician:    HISTORY OF PRESENT ILLNESS (Need 4):  79y Male    PAST MEDICAL & SURGICAL HISTORY:  Hyperlipemia  Mass of right lung  Diastolic CHF, chronic  HTN (hypertension)  DM (diabetes mellitus)  PVD (peripheral vascular disease)  CAD in native artery  Amputation of leg  H/O exploratory laparotomy  S/P femoral-popliteal bypass surgery  H/O heart artery stent      MEDICATIONS  (STANDING):  amLODIPine   Tablet 5 milliGRAM(s) Oral daily  aspirin enteric coated 81 milliGRAM(s) Oral daily  atorvastatin 80 milliGRAM(s) Oral at bedtime  buDESOnide    Inhalation Suspension 0.5 milliGRAM(s) Inhalation two times a day  chlorhexidine 0.12% Liquid 10 milliLiter(s) Swish and Spit once  dextrose 5%. 1000 milliLiter(s) (50 mL/Hr) IV Continuous <Continuous>  dextrose 50% Injectable 12.5 Gram(s) IV Push once  dextrose 50% Injectable 25 Gram(s) IV Push once  dextrose 50% Injectable 25 Gram(s) IV Push once  famotidine    Tablet 20 milliGRAM(s) Oral daily  heparin  Injectable 5000 Unit(s) SubCutaneous every 8 hours  insulin lispro (HumaLOG) corrective regimen sliding scale   SubCutaneous three times a day before meals  isosorbide   mononitrate ER Tablet (IMDUR) 60 milliGRAM(s) Oral daily  lactated ringers. 500 milliLiter(s) (50 mL/Hr) IV Continuous <Continuous>  lidocaine   Patch 2 Patch Transdermal every 24 hours  metoprolol tartrate 25 milliGRAM(s) Oral two times a day  sodium chloride 0.9% lock flush 3 milliLiter(s) IV Push every 8 hours    MEDICATIONS  (PRN):  ALBUTerol    90 MICROgram(s) HFA Inhaler 2 Puff(s) Inhalation every 4 hours PRN Shortness of Breath and/or Wheezing  dextrose 40% Gel 15 Gram(s) Oral once PRN Blood Glucose LESS THAN 70 milliGRAM(s)/deciliter  glucagon  Injectable 1 milliGRAM(s) IntraMuscular once PRN Glucose LESS THAN 70 milligrams/deciliter      Allergies    No Known Allergies    Intolerances        SOCIAL HISTORY:  Smoker:  YES / NO        PACK YEARS:                         WHEN QUIT?  ETOH use:  YES / NO               FREQUENCY / QUANTITY:  Ilicit Drug use:  YES / NO  Occupation:  Assisted device use (Cane / Walker):  Live with:    FAMILY HISTORY:  No pertinent family history in first degree relatives      Review of Systems (Need 10):  CONSTITUTIONAL: Denies fevers / chills, sweats, fatigue, weight loss, weight gain                                       NEURO:  Denies parathesias, seizures, syncope, confusion                                                                                  EYES:  Denies blurry vision, discharge, pain, loss of vision                                                                                    ENMT:  Denies difficulty hearing, vertigo, dysphagia, epistaxis, recent dental work                                       CV:  Denies chest pain, palpitations, BROWN, orthopnea                                                                                           RESPIRATORY:  Denies wWheezing, SOB, cough / sputum, hemoptysis                                                               GI:  Denies nausea, vomiting, diarrhea, constipation, melena                                                                          : Denies hematuria, dysuria, urgency, incontinence                                                                                          MUSKULOSKELETAL:  Denies arthritis, joint swelling, muscle weakness                                                             SKIN/BREAST:  Denies rash, itching, hair loss, masses                                                                                              PSYCH:  Denies depression, anxiety, suicidal ideation                                                                                                HEME/LYMPH:  Denies bruises easily, enlarged lymph nodes, tender lymph nodes                                          ENDOCRINE:  Denies cold intolerance, heat intolerance, polydipsia                                                                      Vital Signs Last 24 Hrs  T(C): 36.5 (2020 09:17), Max: 36.8 (2020 21:57)  T(F): 97.7 (2020 09:17), Max: 98.3 (2020 21:57)  HR: 95 (2020 12:04) (67 - 95)  BP: 143/70 (2020 12:04) (143/70 - 167/70)  BP(mean): 99 (2020 12:04) (99 - 114)  RR: 18 (2020 12:04) (15 - 18)  SpO2: 98% (2020 12:04) (90% - 98%)    Physical Exam (Need 8)  CONSTITUTIONAL:                                                                          WNL  NEURO:                                                                                             WNL                      EYES:                                                                                                  WNL  ENMT:                                                                                                WNL  CV:                                                                                                      WNL  RESPIRATORY:                                                                                  WNL  GI:                                                                                                       WNL  : CASTRO + / -                                                                                 WNL  MUSKULOSKELETAL:                                                                       WNL  SKIN / BREAST:                                                                                 WNL                                                          LABS:                        14.0   8.48  )-----------( 178      ( 2020 05:52 )             43.5     02-14    142  |  105  |  22  ----------------------------<  112<H>  4.9   |  24  |  0.97    Ca    8.6      2020 05:52  Phos  2.6     02-14  Mg     2.0     02-14    TPro  7.0  /  Alb  3.4  /  TBili  0.3  /  DBili  x   /  AST  36  /  ALT  40  /  AlkPhos  81  02-12    PT/INR - ( 2020 21:14 )   PT: 12.3 sec;   INR: 1.08          PTT - ( 2020 21:14 )  PTT:31.5 sec  Urinalysis Basic - ( 2020 21:54 )    Color: Yellow / Appearance: Clear / S.020 / pH: x  Gluc: x / Ketone: Trace mg/dL  / Bili: Negative / Urobili: 0.2 E.U./dL   Blood: x / Protein: 100 mg/dL / Nitrite: NEGATIVE   Leuk Esterase: NEGATIVE / RBC: 5-10 /HPF / WBC < 5 /HPF   Sq Epi: x / Non Sq Epi: 0-5 /HPF / Bacteria: Present /HPF      CARDIAC MARKERS ( 2020 21:14 )  x     / 0.05 ng/mL / x     / x     / x              RADIOLOGY & ADDITIONAL STUDIES:  CAROTID U/S:    CXR:    CT Scan:    EKG:    TTE / SKYE:    Cardiac Cath: Surgeon: Dr. Lara    Requesting Physician: Dr. Saez    HISTORY OF PRESENT ILLNESS   Mr. Richards is an 79 y.o current smoker (1-2 PPD X67 years) male with a PMHx of COPD, PVD s/p right BKA, s/p fem/popliteal bilateral bypass, diastolic CHF, CAD, PCI , DMII (insulin pump) who was taken via ambulance to [facility] ED with SOB and chest pain and was admitted for community acquired pneumonia. A CT scan completed there showed right sided middle lobe lung mass suspicious for malignancy and upon further workup he was diagnosed with an NSTEMI. Stress testing was positive and cardiac catheterization demonstrated mid LAD stenosis (70%). The patient was transferred to [facility] under the care of Dr. Saez for evaluation and management. Cardiac intervention is now held pending further workup of lung mass.     PAST MEDICAL & SURGICAL HISTORY:  Hyperlipemia  Mass of right lung  Diastolic CHF, chronic  HTN (hypertension)  DM (diabetes mellitus)  PVD (peripheral vascular disease)  CAD in native artery  Amputation of leg  H/O exploratory laparotomy  S/P femoral-popliteal bypass surgery  H/O heart artery stent      MEDICATIONS  (STANDING):  amLODIPine   Tablet 5 milliGRAM(s) Oral daily  aspirin enteric coated 81 milliGRAM(s) Oral daily  atorvastatin 80 milliGRAM(s) Oral at bedtime  buDESOnide    Inhalation Suspension 0.5 milliGRAM(s) Inhalation two times a day  chlorhexidine 0.12% Liquid 10 milliLiter(s) Swish and Spit once  dextrose 5%. 1000 milliLiter(s) (50 mL/Hr) IV Continuous <Continuous>  dextrose 50% Injectable 12.5 Gram(s) IV Push once  dextrose 50% Injectable 25 Gram(s) IV Push once  dextrose 50% Injectable 25 Gram(s) IV Push once  famotidine    Tablet 20 milliGRAM(s) Oral daily  heparin  Injectable 5000 Unit(s) SubCutaneous every 8 hours  insulin lispro (HumaLOG) corrective regimen sliding scale   SubCutaneous three times a day before meals  isosorbide   mononitrate ER Tablet (IMDUR) 60 milliGRAM(s) Oral daily  lactated ringers. 500 milliLiter(s) (50 mL/Hr) IV Continuous <Continuous>  lidocaine   Patch 2 Patch Transdermal every 24 hours  metoprolol tartrate 25 milliGRAM(s) Oral two times a day  sodium chloride 0.9% lock flush 3 milliLiter(s) IV Push every 8 hours    MEDICATIONS  (PRN):  ALBUTerol    90 MICROgram(s) HFA Inhaler 2 Puff(s) Inhalation every 4 hours PRN Shortness of Breath and/or Wheezing  dextrose 40% Gel 15 Gram(s) Oral once PRN Blood Glucose LESS THAN 70 milliGRAM(s)/deciliter  glucagon  Injectable 1 milliGRAM(s) IntraMuscular once PRN Glucose LESS THAN 70 milligrams/deciliter    Allergies    No Known Allergies    Intolerances    SOCIAL HISTORY:  Smoker:  YES      PACK YEARS:   67+        WHEN QUIT? Current smoker  ETOH use:   NO         Ilicit Drug use:   NO  Occupation: Retired  Assisted device use (Cane / Walker): Wheelchair (BKA)    FAMILY HISTORY:  No pertinent family history in first degree relatives      Review of Systems (Need 10):  CONSTITUTIONAL: Denies fevers / chills, sweats, fatigue, weight loss, weight gain                                       NEURO:  Denies parathesias, seizures, syncope, confusion                                                                                  EYES:  Denies blurry vision, discharge, pain, loss of vision                                                                                    ENMT:  Denies difficulty hearing, vertigo, dysphagia, epistaxis, recent dental work                                       CV:  Denies chest pain, palpitations, BROWN, orthopnea                                                                                           RESPIRATORY:  Denies wWheezing, SOB, cough / sputum, hemoptysis                                                               GI:  Denies nausea, vomiting, diarrhea, constipation, melena                                                                          : Denies hematuria, dysuria, urgency, incontinence                                                                                          MUSKULOSKELETAL:  Denies arthritis, joint swelling, muscle weakness                                                             SKIN/BREAST:  Denies rash, itching, hair loss, masses                                                                                              PSYCH:  Denies depression, anxiety, suicidal ideation                                                                                                HEME/LYMPH:  Denies bruises easily, enlarged lymph nodes, tender lymph nodes                                          ENDOCRINE:  Denies cold intolerance, heat intolerance, polydipsia                                                                      Vital Signs Last 24 Hrs  T(C): 36.5 (2020 09:17), Max: 36.8 (2020 21:57)  T(F): 97.7 (2020 09:17), Max: 98.3 (2020 21:57)  HR: 95 (2020 12:04) (67 - 95)  BP: 143/70 (2020 12:04) (143/70 - 167/70)  BP(mean): 99 (2020 12:04) (99 - 114)  RR: 18 (2020 12:04) (15 - 18)  SpO2: 98% (2020 12:04) (90% - 98%)    PHYSICAL EXAM:  General: Well appearing, in NAD assessed laying comfortably in bed  Neurological: A&OX3, no focal deficits noted. Moving bilateral upper and lower extremities.   Cardiovascular: RRR, no murmurs, rubs, gallops  Respiratory: Clear to auscultation bilateral posterior lung fields. No wheezes, rales, rhonchi.  Gastrointestinal: +BS, NT/ND  Extremities: No peripheral edema, no calf tenderness. Full strength and ROM bilateral upper and lower extremities. Right sided BKA    Vascular: Bilateral distal pulses 2+ radial  Incision Sites: R groin cath site clean, no hematoma                                                           LABS:                        14.0   8.48  )-----------( 178      ( 2020 05:52 )             43.5     02-14    142  |  105  |  22  ----------------------------<  112<H>  4.9   |  24  |  0.97    Ca    8.6      2020 05:52  Phos  2.6     02-14  Mg     2.0     02-14    TPro  7.0  /  Alb  3.4  /  TBili  0.3  /  DBili  x   /  AST  36  /  ALT  40  /  AlkPhos  81  02-12    PT/INR - ( 2020 21:14 )   PT: 12.3 sec;   INR: 1.08         PTT - ( 2020 21:14 )  PTT:31.5 sec  Urinalysis Basic - ( 2020 21:54 )    Color: Yellow / Appearance: Clear / S.020 / pH: x  Gluc: x / Ketone: Trace mg/dL  / Bili: Negative / Urobili: 0.2 E.U./dL   Blood: x / Protein: 100 mg/dL / Nitrite: NEGATIVE   Leuk Esterase: NEGATIVE / RBC: 5-10 /HPF / WBC < 5 /HPF   Sq Epi: x / Non Sq Epi: 0-5 /HPF / Bacteria: Present /HPF      CARDIAC MARKERS ( 2020 21:14 )  x     / 0.05 ng/mL / x     / x     / x        RADIOLOGY & ADDITIONAL STUDIES:  CAROTID U/S:   < from: US Duplex Carotid Arteries Complete, Bilateral (20 @ 10:32) >  IMPRESSION:  No hemodynamically significant carotid artery stenosis.   Moderate calcified plaque bilaterally.    CXR:  < from: Xray Chest 1 View- PORTABLE-Routine (20 @ 20:35) >  Impression:    Focal atelectasis/infiltrate right upper lobe has improved compared to prior outside exam from [facility]. Scattered increased lung markings are present and suspicious for pulmonary vascular congestion. No pleural effusion. No pneumothorax. Mild hypoinflation. No acute bone abnormality.    CT Scan:  < from: NM PET/CT Onc FDG Skull to Thigh, Inital (20 @ 17:17) >  Impression:     FDG avid right hilar mass corresponding to findings on recent CT chest suspicious for malignancy. FDG avid right lower paratracheal and prevascular lymph nodes suspicious for malignancy.     FDG avid bilateral hilar lymph nodes which are in a pattern typical of inflammatory/infectious etiology although given the presence of a right hilar mass neoplasm cannot be excluded.    Focal uptake within the right palatine tonsil which is nonspecific, correlation with direct visualization is recommended.      TTE / SKYE:  < from: TTE Echo w/ Cont Complete (20 @ 08:53) >  CONCLUSIONS:     1. Mildly reduced left ventricular systolic function.   2. Grade I left ventricular diastolic dysfunction.   3. Normal right ventricular size and systolic function.   4. Mild mitral regurgitation.   5. No evidence of pulmonary hypertension.   6. No pericardial effusion.    Cardiac Cath:  Mid LAD stenosis 70%

## 2020-02-14 NOTE — CONSULT NOTE ADULT - ASSESSMENT
79 year old M, current 1-2PPD smoker (long term), with PMH CAD s/p PCI (2005), IDDM, PVD s/p R BKA, s/p fem-pop bypass, COPD, HFpEF with NSTEMI, planned MIDCAB delayed given pulmonary mass pending biopsy with interventional pulmonology. Medicine consult service activated for recommendations regarding COPD and IDDM    1. COPD  - CXR 2/12 with b/l infiltrate  - Patient afebrile, VSS, does not meet SIRS criteria  - No antibiotics at this time    2. DM  - A1C 7.6%, reasonable control  - C/w moderate does SSI  - F/u FSGs. Received 2U insulin coverage 2/13, no lantus at this time  - Please change diet to consistent carb    3. CAD  - Pending MIDCAB, management as per cardiology team    4. CHF  - Management as per cardiology team 79 year old M, current 1-2PPD smoker (long term), with PMH CAD s/p PCI (2005), IDDM, PVD s/p R BKA, s/p fem-pop bypass, COPD, HFpEF with NSTEMI, planned MIDCAB delayed given pulmonary mass pending biopsy with interventional pulmonology. Medicine consult service activated for recommendations regarding COPD and IDDM    1. COPD  - CXR 2/12 with b/l infiltrate  - Patient afebrile, VSS, does not meet SIRS criteria  - No antibiotics at this time  - Takes anoro ellipta and ventolin at home, if not available in house can continue on current pulmicort and proventil - patient reports no symptoms and breathing well    2. DM  - A1C 7.6%, reasonable control  - C/w moderate does SSI  - F/u FSGs. Received 2U insulin coverage 2/13, no lantus at this time. Will calculate requirement for lantus from today's sliding scale insulin requirement  - Please change diet to consistent carb    3. CAD  - Pending MIDCAB, management as per cardiology team    4. CHF  - Management as per cardiology team

## 2020-02-14 NOTE — CONSULT NOTE ADULT - SUBJECTIVE AND OBJECTIVE BOX
Medicine Consult Note    JESSENIA LUCIA  79y  Male    INTERVAL HPI/OVERNIGHT EVENTS:      Review of Systems:  14 system ROS negative except as above      T(C): 36.9 (20 @ 13:34), Max: 36.9 (20 @ 13:34)  HR: 95 (20 @ 12:04) (67 - 95)  BP: 143/70 (20 @ 12:04) (143/70 - 167/70)  RR: 18 (20 @ 12:04) (15 - 18)  SpO2: 98% (20 @ 12:04) (90% - 98%)  Wt(kg): --Vital Signs Last 24 Hrs  T(C): 36.9 (2020 13:34), Max: 36.9 (2020 13:34)  T(F): 98.4 (2020 13:34), Max: 98.4 (2020 13:34)  HR: 95 (2020 12:04) (67 - 95)  BP: 143/70 (2020 12:04) (143/70 - 167/70)  BP(mean): 99 (2020 12:04) (99 - 114)  RR: 18 (2020 12:04) (15 - 18)  SpO2: 98% (2020 12:04) (90% - 98%)    Constitutional: WDWN resting comfortably in bed, NAD  Head: NC/AT  Eyes: EOMI, anicteric sclera  ENT: no nasal discharge  Neck: supple; no JVD  Respiratory: CTA b/l, no increased work of breathing  Cardiac: regular rate, +S1/S2  Gastrointestinal: soft, NT/ND, +BS  Extremities: WWP, no LE edema  Vascular: 2+ radial pulses B/L  Dermatologic: skin warm, dry and intact  Neurologic: Alert and oriented, no focal deficits appreciated  Psychiatric: affect and characteristics of appearance, verbalizations, behaviors are appropriate    Consultant(s) Notes Reviewed:  [x ] YES  [ ] NO  Care Discussed with Consultants/Other Providers [ x] YES  [ ] NO    LABS:                        14.0   8.48  )-----------( 178      ( 2020 05:52 )             43.5     02-    142  |  105  |  22  ----------------------------<  112<H>  4.9   |  24  |  0.97    Ca    8.6      2020 05:52  Phos  2.6     -  Mg     2.0         TPro  7.0  /  Alb  3.4  /  TBili  0.3  /  DBili  x   /  AST  36  /  ALT  40  /  AlkPhos  81  02-12    PT/INR - ( 2020 21:14 )   PT: 12.3 sec;   INR: 1.08          PTT - ( 2020 21:14 )  PTT:31.5 sec  Urinalysis Basic - ( 2020 21:54 )    Color: Yellow / Appearance: Clear / S.020 / pH: x  Gluc: x / Ketone: Trace mg/dL  / Bili: Negative / Urobili: 0.2 E.U./dL   Blood: x / Protein: 100 mg/dL / Nitrite: NEGATIVE   Leuk Esterase: NEGATIVE / RBC: 5-10 /HPF / WBC < 5 /HPF   Sq Epi: x / Non Sq Epi: 0-5 /HPF / Bacteria: Present /HPF      CAPILLARY BLOOD GLUCOSE      POCT Blood Glucose.: 223 mg/dL (2020 11:40)  POCT Blood Glucose.: 117 mg/dL (2020 06:40)  POCT Blood Glucose.: 107 mg/dL (2020 17:14)      ABG - ( 2020 20:45 )  pH, Arterial: 7.44  pH, Blood: x     /  pCO2: 43    /  pO2: 41    / HCO3: 28    / Base Excess: 3.6   /  SaO2: 75                Urinalysis Basic - ( 2020 21:54 )    Color: Yellow / Appearance: Clear / S.020 / pH: x  Gluc: x / Ketone: Trace mg/dL  / Bili: Negative / Urobili: 0.2 E.U./dL   Blood: x / Protein: 100 mg/dL / Nitrite: NEGATIVE   Leuk Esterase: NEGATIVE / RBC: 5-10 /HPF / WBC < 5 /HPF   Sq Epi: x / Non Sq Epi: 0-5 /HPF / Bacteria: Present /HPF        ALBUTerol    90 MICROgram(s) HFA Inhaler 2 Puff(s) Inhalation every 4 hours PRN  amLODIPine   Tablet 5 milliGRAM(s) Oral daily  aspirin enteric coated 81 milliGRAM(s) Oral daily  atorvastatin 80 milliGRAM(s) Oral at bedtime  buDESOnide    Inhalation Suspension 0.5 milliGRAM(s) Inhalation two times a day  chlorhexidine 0.12% Liquid 10 milliLiter(s) Swish and Spit once  dextrose 40% Gel 15 Gram(s) Oral once PRN  dextrose 5%. 1000 milliLiter(s) IV Continuous <Continuous>  dextrose 50% Injectable 12.5 Gram(s) IV Push once  dextrose 50% Injectable 25 Gram(s) IV Push once  dextrose 50% Injectable 25 Gram(s) IV Push once  famotidine    Tablet 20 milliGRAM(s) Oral daily  glucagon  Injectable 1 milliGRAM(s) IntraMuscular once PRN  heparin  Injectable 5000 Unit(s) SubCutaneous every 8 hours  insulin lispro (HumaLOG) corrective regimen sliding scale   SubCutaneous three times a day before meals  isosorbide   mononitrate ER Tablet (IMDUR) 60 milliGRAM(s) Oral daily  lactated ringers. 500 milliLiter(s) IV Continuous <Continuous>  lidocaine   Patch 2 Patch Transdermal every 24 hours  metoprolol tartrate 25 milliGRAM(s) Oral two times a day  sodium chloride 0.9% lock flush 3 milliLiter(s) IV Push every 8 hours      RADIOLOGY & ADDITIONAL TESTS reviewed Medicine Consult Note    JESSENIA LUCIA  79y  Male    HPI: Patient is an 79 year old M, current 1-2PPD smoker (long term), with PMH CAD s/p PCI (), IDDM, PVD s/p R BKA, s/p fem-pop bypass, COPD, HFpEF, who presented with 1 week SOB and chest pain to [place]. He was found to have R middle lobe lung mass that was suspicious for malignancy at OSH, as well as NSTEMI in setting of positive stress test. He was cathed at OSH with mid LAD stenosis 70%, transferred to [facility] for evluation and planned for MIDCAB. Interventional pulmonology team consulted at [facility] and planning for mass biopsy prior to cardiac intervention. Patient underwent MRI head which was motion artifact but r/o large brain mets, as well as PET scan with increased activity correlating to lung mass. Patient overnight with 5 second pause, in setting of no intervention immediately from cardiac standpoint will be transferred to cardiac telemetry service. Per CT surgery, cardiology service requesting medical consult activation for COPD and diabetes comorbidities.       Review of Systems:  14 system ROS negative except as above      T(C): 36.9 (20 @ 13:34), Max: 36.9 (20 @ 13:34)  HR: 95 (20 @ 12:04) (67 - 95)  BP: 143/70 (20 @ 12:04) (143/70 - 167/70)  RR: 18 (20 @ 12:04) (15 - 18)  SpO2: 98% (20 @ 12:04) (90% - 98%)  Wt(kg): --Vital Signs Last 24 Hrs  T(C): 36.9 (2020 13:34), Max: 36.9 (2020 13:34)  T(F): 98.4 (2020 13:34), Max: 98.4 (2020 13:34)  HR: 95 (2020 12:04) (67 - 95)  BP: 143/70 (2020 12:04) (143/70 - 167/70)  BP(mean): 99 (2020 12:04) (99 - 114)  RR: 18 (2020 12:04) (15 - 18)  SpO2: 98% (2020 12:04) (90% - 98%)    Constitutional: WDWN resting comfortably in bed, NAD  Head: NC/AT  Eyes: EOMI, anicteric sclera  ENT: no nasal discharge  Neck: supple; no JVD  Respiratory: CTA b/l, no increased work of breathing  Cardiac: regular rate, +S1/S2  Gastrointestinal: soft, NT/ND, +BS  Extremities: WWP, no LE edema  Vascular: 2+ radial pulses B/L  Dermatologic: skin warm, dry and intact  Neurologic: Alert and oriented, no focal deficits appreciated  Psychiatric: affect and characteristics of appearance, verbalizations, behaviors are appropriate    Consultant(s) Notes Reviewed:  [x ] YES  [ ] NO  Care Discussed with Consultants/Other Providers [ x] YES  [ ] NO    LABS:                        14.0   8.48  )-----------( 178      ( 2020 05:52 )             43.5     02-14    142  |  105  |  22  ----------------------------<  112<H>  4.9   |  24  |  0.97    Ca    8.6      2020 05:52  Phos  2.6     02-14  Mg     2.0     02-14    TPro  7.0  /  Alb  3.4  /  TBili  0.3  /  DBili  x   /  AST  36  /  ALT  40  /  AlkPhos  81  02-12    PT/INR - ( 2020 21:14 )   PT: 12.3 sec;   INR: 1.08          PTT - ( 2020 21:14 )  PTT:31.5 sec  Urinalysis Basic - ( 2020 21:54 )    Color: Yellow / Appearance: Clear / S.020 / pH: x  Gluc: x / Ketone: Trace mg/dL  / Bili: Negative / Urobili: 0.2 E.U./dL   Blood: x / Protein: 100 mg/dL / Nitrite: NEGATIVE   Leuk Esterase: NEGATIVE / RBC: 5-10 /HPF / WBC < 5 /HPF   Sq Epi: x / Non Sq Epi: 0-5 /HPF / Bacteria: Present /HPF      CAPILLARY BLOOD GLUCOSE      POCT Blood Glucose.: 223 mg/dL (2020 11:40)  POCT Blood Glucose.: 117 mg/dL (2020 06:40)  POCT Blood Glucose.: 107 mg/dL (2020 17:14)      ABG - ( 2020 20:45 )  pH, Arterial: 7.44  pH, Blood: x     /  pCO2: 43    /  pO2: 41    / HCO3: 28    / Base Excess: 3.6   /  SaO2: 75                Urinalysis Basic - ( 2020 21:54 )    Color: Yellow / Appearance: Clear / S.020 / pH: x  Gluc: x / Ketone: Trace mg/dL  / Bili: Negative / Urobili: 0.2 E.U./dL   Blood: x / Protein: 100 mg/dL / Nitrite: NEGATIVE   Leuk Esterase: NEGATIVE / RBC: 5-10 /HPF / WBC < 5 /HPF   Sq Epi: x / Non Sq Epi: 0-5 /HPF / Bacteria: Present /HPF        ALBUTerol    90 MICROgram(s) HFA Inhaler 2 Puff(s) Inhalation every 4 hours PRN  amLODIPine   Tablet 5 milliGRAM(s) Oral daily  aspirin enteric coated 81 milliGRAM(s) Oral daily  atorvastatin 80 milliGRAM(s) Oral at bedtime  buDESOnide    Inhalation Suspension 0.5 milliGRAM(s) Inhalation two times a day  chlorhexidine 0.12% Liquid 10 milliLiter(s) Swish and Spit once  dextrose 40% Gel 15 Gram(s) Oral once PRN  dextrose 5%. 1000 milliLiter(s) IV Continuous <Continuous>  dextrose 50% Injectable 12.5 Gram(s) IV Push once  dextrose 50% Injectable 25 Gram(s) IV Push once  dextrose 50% Injectable 25 Gram(s) IV Push once  famotidine    Tablet 20 milliGRAM(s) Oral daily  glucagon  Injectable 1 milliGRAM(s) IntraMuscular once PRN  heparin  Injectable 5000 Unit(s) SubCutaneous every 8 hours  insulin lispro (HumaLOG) corrective regimen sliding scale   SubCutaneous three times a day before meals  isosorbide   mononitrate ER Tablet (IMDUR) 60 milliGRAM(s) Oral daily  lactated ringers. 500 milliLiter(s) IV Continuous <Continuous>  lidocaine   Patch 2 Patch Transdermal every 24 hours  metoprolol tartrate 25 milliGRAM(s) Oral two times a day  sodium chloride 0.9% lock flush 3 milliLiter(s) IV Push every 8 hours      RADIOLOGY & ADDITIONAL TESTS reviewed Medicine Consult Note    JESSENIA LUCIA  79y  Male    HPI: Patient is an 79 year old M, current 1-2PPD smoker (long term), with PMH CAD s/p PCI (), IDDM, PVD s/p R BKA, s/p fem-pop bypass, COPD (on ventolin and anoro ellipta), HFpEF, who presented with 1 week SOB and chest pain to [place]. He was found to have R middle lobe lung mass that was suspicious for malignancy at OSH, as well as NSTEMI in setting of positive stress test. He was cathed at OSH with mid LAD stenosis 70%, transferred to [facility] for evaluation and planned for MIDCAB. Interventional pulmonology team consulted at [facility] and planning for mass biopsy prior to cardiac intervention. Patient underwent MRI head which was motion artifact but r/o large brain mets, as well as PET scan with increased activity correlating to lung mass. Patient overnight with 5 second pause, in setting of no intervention immediately from cardiac standpoint will be transferred to cardiac telemetry service. Per CT surgery, cardiology service requesting medical consult activation for COPD and diabetes comorbidities.     PSH: BKA R knee, hernia repair  FH: mother with gyn cancer, child with DM      Review of Systems:  14 system ROS negative except as above      T(C): 36.9 (20 @ 13:34), Max: 36.9 (20 @ 13:34)  HR: 95 (20 @ 12:04) (67 - 95)  BP: 143/70 (20 @ 12:04) (143/70 - 167/70)  RR: 18 (20 @ 12:04) (15 - 18)  SpO2: 98% (20 @ 12:04) (90% - 98%)  Wt(kg): --Vital Signs Last 24 Hrs  T(C): 36.9 (2020 13:34), Max: 36.9 (2020 13:34)  T(F): 98.4 (2020 13:34), Max: 98.4 (2020 13:34)  HR: 95 (2020 12:04) (67 - 95)  BP: 143/70 (2020 12:04) (143/70 - 167/70)  BP(mean): 99 (2020 12:04) (99 - 114)  RR: 18 (2020 12:04) (15 - 18)  SpO2: 98% (2020 12:04) (90% - 98%)    Constitutional: WDWN resting comfortably in bed, NAD  Head: NC/AT  Eyes: EOMI, anicteric sclera  ENT: no nasal discharge  Neck: supple  Respiratory: CTA b/l, no increased work of breathing  Cardiac: regular rate, +S1/S2  Gastrointestinal: soft, NT/ND, +BS. Has midline scar present, remote  Extremities: WWP, no LE edema. RLE s/p BKA incision present and healed  Dermatologic: skin warm, dry  Neurologic: Alert and oriented, no focal deficits appreciated  Psychiatric: affect and characteristics of appearance, verbalizations, behaviors are appropriate    Consultant(s) Notes Reviewed:  [x ] YES  [ ] NO  Care Discussed with Consultants/Other Providers [ x] YES  [ ] NO    LABS:                        14.0   8.48  )-----------( 178      ( 2020 05:52 )             43.5     02-14    142  |  105  |  22  ----------------------------<  112<H>  4.9   |  24  |  0.97    Ca    8.6      2020 05:52  Phos  2.6     02-14  Mg     2.0     02-14    TPro  7.0  /  Alb  3.4  /  TBili  0.3  /  DBili  x   /  AST  36  /  ALT  40  /  AlkPhos  81  02-12    PT/INR - ( 2020 21:14 )   PT: 12.3 sec;   INR: 1.08          PTT - ( 2020 21:14 )  PTT:31.5 sec  Urinalysis Basic - ( 2020 21:54 )    Color: Yellow / Appearance: Clear / S.020 / pH: x  Gluc: x / Ketone: Trace mg/dL  / Bili: Negative / Urobili: 0.2 E.U./dL   Blood: x / Protein: 100 mg/dL / Nitrite: NEGATIVE   Leuk Esterase: NEGATIVE / RBC: 5-10 /HPF / WBC < 5 /HPF   Sq Epi: x / Non Sq Epi: 0-5 /HPF / Bacteria: Present /HPF      CAPILLARY BLOOD GLUCOSE      POCT Blood Glucose.: 223 mg/dL (2020 11:40)  POCT Blood Glucose.: 117 mg/dL (2020 06:40)  POCT Blood Glucose.: 107 mg/dL (2020 17:14)      ABG - ( 2020 20:45 )  pH, Arterial: 7.44  pH, Blood: x     /  pCO2: 43    /  pO2: 41    / HCO3: 28    / Base Excess: 3.6   /  SaO2: 75                Urinalysis Basic - ( 2020 21:54 )    Color: Yellow / Appearance: Clear / S.020 / pH: x  Gluc: x / Ketone: Trace mg/dL  / Bili: Negative / Urobili: 0.2 E.U./dL   Blood: x / Protein: 100 mg/dL / Nitrite: NEGATIVE   Leuk Esterase: NEGATIVE / RBC: 5-10 /HPF / WBC < 5 /HPF   Sq Epi: x / Non Sq Epi: 0-5 /HPF / Bacteria: Present /HPF        ALBUTerol    90 MICROgram(s) HFA Inhaler 2 Puff(s) Inhalation every 4 hours PRN  amLODIPine   Tablet 5 milliGRAM(s) Oral daily  aspirin enteric coated 81 milliGRAM(s) Oral daily  atorvastatin 80 milliGRAM(s) Oral at bedtime  buDESOnide    Inhalation Suspension 0.5 milliGRAM(s) Inhalation two times a day  chlorhexidine 0.12% Liquid 10 milliLiter(s) Swish and Spit once  dextrose 40% Gel 15 Gram(s) Oral once PRN  dextrose 5%. 1000 milliLiter(s) IV Continuous <Continuous>  dextrose 50% Injectable 12.5 Gram(s) IV Push once  dextrose 50% Injectable 25 Gram(s) IV Push once  dextrose 50% Injectable 25 Gram(s) IV Push once  famotidine    Tablet 20 milliGRAM(s) Oral daily  glucagon  Injectable 1 milliGRAM(s) IntraMuscular once PRN  heparin  Injectable 5000 Unit(s) SubCutaneous every 8 hours  insulin lispro (HumaLOG) corrective regimen sliding scale   SubCutaneous three times a day before meals  isosorbide   mononitrate ER Tablet (IMDUR) 60 milliGRAM(s) Oral daily  lactated ringers. 500 milliLiter(s) IV Continuous <Continuous>  lidocaine   Patch 2 Patch Transdermal every 24 hours  metoprolol tartrate 25 milliGRAM(s) Oral two times a day  sodium chloride 0.9% lock flush 3 milliLiter(s) IV Push every 8 hours      RADIOLOGY & ADDITIONAL TESTS reviewed

## 2020-02-14 NOTE — DIETITIAN INITIAL EVALUATION ADULT. - PROBLEM SELECTOR PROBLEM 7
Type 2 diabetes mellitus with other circulatory complication normal/airway patent/breath sounds equal/respirations non-labored/clear to auscultation bilaterally/good air movement

## 2020-02-14 NOTE — CONSULT NOTE ADULT - ASSESSMENT
Assesment:  Mr. Richards is an 79 y.o current smoker (1-2 PPD X67 years) male with a PMHx of COPD, PVD s/p right BKA, s/p fem/popliteal bilateral bypass, diastolic CHF, CAD, PCI 2005, DMII (insulin pump) who was taken via ambulance to [facility] ED with SOB and chest pain and was admitted for community acquired pneumonia. A CT scan completed there showed right sided middle lobe lung mass suspicious for malignancy and upon further workup he was diagnosed with an NSTEMI. Stress testing was positive and cardiac catheterization demonstrated mid LAD stenosis (70%). The patient was transferred to [facility] under the care of Dr. Saez for evaluation and management. Cardiac intervention is now held pending further workup of lung mass.     Plan:  Problem 1: Right lung mass  - MRI brain and PET CT completed yesterday  - MRI: No evidence brain mets but not conclusive 2/2 patient motion  - PET CT: FDG avid right hilar mass, FGD avid right lower paratracheal and prevascular LN, bilateral hilar LN, palatine tonsil  - Plan for IP biopsy on Tuesday   - Continue to hold plavix prior to biopsy    Problem 2: NSTEMI  - Not currently surgical candidate for cardiac intervention  - 5 second pause overnight, EP following, continue to appreciate recs  - Hold plavix prior to biopsy, continue with ASA  - High risk patient for general anesthesiology, cardiac anesthesia aware of planned procedure Thursday, follow up plan  - Continue atorvastatin 80 mg    Problem 3: HTN  - Holding beta blocker for 5 second pause overnight  - Continue to monitor tele and BP  - Continue imdur      Problem 4: COPD  - Continue albuterol and budesonide     I have reviewed clinical labs tests and reports, radiology tests and reports, as well as old patient medical records, and discussed with the refering physician.

## 2020-02-14 NOTE — CONSULT NOTE ADULT - REASON FOR ADMISSION
"I had trouble breathing".

## 2020-02-14 NOTE — DIETITIAN INITIAL EVALUATION ADULT. - OTHER INFO
79M current smoker (1-2 PPD X67 years) with a PMHx of COPD, PVD s/p right BKA, s/p fem/popliteal bilateral bypass, diastolic CHF, CAD, PCI 2005, DMII (insulin pump- A1C 7.6) who was taken via ambulance to [facility] ED with SOB and chest pain and was admitted for CAP. A CT scan completed there showed right sided middle lobe lung mass suspicious for malignancy and upon further workup he was diagnosed with an NSTEMI. Stress test positive and cardiac catheterization demonstrated mid LAD stenosis. The patient was transferred to [facility] for evaluation and management, planned for MIDCAB. Cardiac intervention is now held pending further workup of lung mass found on chest CT. Pt observed sitting up in chair NAD. No noted n/v/d/c, chewing/ swallowing issues or pain impacting intake, skin is intact, dontae 18. NKFA or changes in wt noted. Fair tolerance to DASH diet endorsed. Will continue to follow per protocol.

## 2020-02-14 NOTE — DIETITIAN INITIAL EVALUATION ADULT. - ENERGY NEEDS
Adj BW used for BKA   ABW 73.8kg, IBW 61.8kg, 119% IBW, Adj BW 58kg, BMI 27.1   Nutrient needs based on [facility] standards of care for maintenance in adults, adjusted for age and pre-op needs, fluid per team

## 2020-02-14 NOTE — CONSULT NOTE ADULT - ASSESSMENT
78 y/o male smoker with history of COPD, DMII (insulin pump), PVA s/p Right BKA (2017), s/p fem/pop bypass bilateral?, diastolic CHF, CAD, PCI 2005, awaiting for workup for right lung mass prior to CABG for mLAD disease.  Telemetry with a 5-sec pause during which there was AVB but the P-P intervals progressively lengthened. This is likely secondary to high vagal tone during rest. 80 y/o male smoker with history of COPD, DMII (insulin pump), PVA s/p Right BKA (2017), s/p fem/pop bypass bilateral?, diastolic CHF, CAD, PCI 2005, awaiting for workup for right lung mass prior to CABG for mLAD disease.  Telemetry with a 5-sec pause during which there was AVB but the P-P intervals progressively lengthened. This is likely secondary to high vagal tone during rest.  - Continue tele  - pending round with attending 80 y/o male smoker with history of COPD, DMII (insulin pump), PVA s/p Right BKA (2017), s/p fem/pop bypass bilateral?, diastolic CHF, CAD, PCI 2005, awaiting for workup for right lung mass prior to CABG for mLAD disease.  Telemetry with a 5-sec pause during which there was AVB but the P-P intervals progressively lengthened. Also, the [place] interval lengthens out before the pause. This is highly suggestive of high vagal tone during rest. LVEF 47% on echo. No h/o syncope.   - Continue tele. Ok to continue Metoprolol 25 mg bid. No indication for PPM for this.    - Case d/w Dr. Raines.

## 2020-02-14 NOTE — PROGRESS NOTE ADULT - SUBJECTIVE AND OBJECTIVE BOX
TRANSFER NOTE    Patient discussed on morning rounds with Dr. Saez    Operation / Date: No cardiac intervention planned at this time     SUBJECTIVE ASSESSMENT:  79y Male assessed at bedside today. Patient states he feels well today. He denies chest pain (last episode last Thursday). He denies SOB. He has not had a bowel movement while admitted but endorses flatus. He continues to use IS (1500 ml) and ambulate on the floor independently. He denies fever, chills, nausea, vomiting.     Vital Signs Last 24 Hrs  T(C): 36.5 (2020 09:17), Max: 36.8 (2020 21:57)  T(F): 97.7 (2020 09:17), Max: 98.3 (2020 21:57)  HR: 95 (2020 12:04) (67 - 95)  BP: 143/70 (2020 12:04) (143/70 - 167/70)  BP(mean): 99 (2020 12:04) (99 - 114)  RR: 18 (2020 12:04) (15 - 18)  SpO2: 98% (2020 12:04) (90% - 98%)  I&O's Detail    2020 07:01  -  2020 07:00  --------------------------------------------------------  IN:    lactated ringers.: 250 mL  Total IN: 250 mL    OUT:    Voided: 850 mL  Total OUT: 850 mL    Total NET: -600 mL    CHEST TUBE:  No  MAX DRAIN:  No.  EPICARDIAL WIRES: No.  TIE DOWNS: No.  CASTRO: No.    PHYSICAL EXAM:  General: Well appearing, in NAD assessed laying comfortably in bed  Neurological: A&OX3, no focal deficits noted. Moving bilateral upper and lower extremities.   Cardiovascular: RRR, no murmurs, rubs, gallops  Respiratory: Clear to auscultation bilateral posterior lung fields. No wheezes, rales, rhonchi.  Gastrointestinal: +BS, NT/ND  Extremities: No peripheral edema, no calf tenderness. Full strength and ROM bilateral upper and lower extremities. Right sided BKA    Vascular: Bilateral distal pulses 2+  Incision Sites: None    LABS:                        14.0   8.48  )-----------( 178      ( 2020 05:52 )             43.5       COUMADIN:  Yes/No. REASON: .    PT/INR - ( 2020 21:14 )   PT: 12.3 sec;   INR: 1.08          PTT - ( 2020 21:14 )  PTT:31.5 sec        142  |  105  |  22  ----------------------------<  112<H>  4.9   |  24  |  0.97    Ca    8.6      2020 05:52  Phos  2.6       Mg     2.0         TPro  7.0  /  Alb  3.4  /  TBili  0.3  /  DBili  x   /  AST  36  /  ALT  40  /  AlkPhos  81  12      Urinalysis Basic - ( 2020 21:54 )    Color: Yellow / Appearance: Clear / S.020 / pH: x  Gluc: x / Ketone: Trace mg/dL  / Bili: Negative / Urobili: 0.2 E.U./dL   Blood: x / Protein: 100 mg/dL / Nitrite: NEGATIVE   Leuk Esterase: NEGATIVE / RBC: 5-10 /HPF / WBC < 5 /HPF   Sq Epi: x / Non Sq Epi: 0-5 /HPF / Bacteria: Present /HPF        MEDICATIONS  (STANDING):  amLODIPine   Tablet 5 milliGRAM(s) Oral daily  aspirin enteric coated 81 milliGRAM(s) Oral daily  atorvastatin 80 milliGRAM(s) Oral at bedtime  buDESOnide    Inhalation Suspension 0.5 milliGRAM(s) Inhalation two times a day  chlorhexidine 0.12% Liquid 10 milliLiter(s) Swish and Spit once  dextrose 5%. 1000 milliLiter(s) (50 mL/Hr) IV Continuous <Continuous>  dextrose 50% Injectable 12.5 Gram(s) IV Push once  dextrose 50% Injectable 25 Gram(s) IV Push once  dextrose 50% Injectable 25 Gram(s) IV Push once  famotidine    Tablet 20 milliGRAM(s) Oral daily  heparin  Injectable 5000 Unit(s) SubCutaneous every 8 hours  insulin lispro (HumaLOG) corrective regimen sliding scale   SubCutaneous three times a day before meals  isosorbide   mononitrate ER Tablet (IMDUR) 60 milliGRAM(s) Oral daily  lactated ringers. 500 milliLiter(s) (50 mL/Hr) IV Continuous <Continuous>  lidocaine   Patch 2 Patch Transdermal every 24 hours  metoprolol tartrate 25 milliGRAM(s) Oral two times a day  sodium chloride 0.9% lock flush 3 milliLiter(s) IV Push every 8 hours    MEDICATIONS  (PRN):  ALBUTerol    90 MICROgram(s) HFA Inhaler 2 Puff(s) Inhalation every 4 hours PRN Shortness of Breath and/or Wheezing  dextrose 40% Gel 15 Gram(s) Oral once PRN Blood Glucose LESS THAN 70 milliGRAM(s)/deciliter  glucagon  Injectable 1 milliGRAM(s) IntraMuscular once PRN Glucose LESS THAN 70 milligrams/deciliter        RADIOLOGY & ADDITIONAL TESTS: TRANSFER NOTE    Patient discussed on morning rounds with Dr. Saez    Operation / Date: No cardiac intervention planned at this time     SUBJECTIVE ASSESSMENT:  79y Male assessed at bedside today. Patient states he feels well today. He denies chest pain (last episode last Thursday). He denies SOB. He has not had a bowel movement while admitted but endorses flatus. He continues to use IS (1500 ml) and ambulate on the floor independently. He denies fever, chills, nausea, vomiting.     Vital Signs Last 24 Hrs  T(C): 36.5 (2020 09:17), Max: 36.8 (2020 21:57)  T(F): 97.7 (2020 09:17), Max: 98.3 (2020 21:57)  HR: 95 (2020 12:04) (67 - 95)  BP: 143/70 (2020 12:04) (143/70 - 167/70)  BP(mean): 99 (2020 12:04) (99 - 114)  RR: 18 (2020 12:04) (15 - 18)  SpO2: 98% (2020 12:04) (90% - 98%)  I&O's Detail    2020 07:01  -  2020 07:00  --------------------------------------------------------  IN:    lactated ringers.: 250 mL  Total IN: 250 mL    OUT:    Voided: 850 mL  Total OUT: 850 mL    Total NET: -600 mL    CHEST TUBE:  No  MAX DRAIN:  No.  EPICARDIAL WIRES: No.  TIE DOWNS: No.  CASTRO: No.    PHYSICAL EXAM:  General: Well appearing, in NAD assessed laying comfortably in bed  Neurological: A&OX3, no focal deficits noted. Moving bilateral upper and lower extremities.   Cardiovascular: RRR, no murmurs, rubs, gallops  Respiratory: Clear to auscultation bilateral posterior lung fields. No wheezes, rales, rhonchi.  Gastrointestinal: +BS, NT/ND  Extremities: No peripheral edema, no calf tenderness. Full strength and ROM bilateral upper and lower extremities. Right sided BKA    Vascular: Bilateral distal pulses 2+  Incision Sites: None    LABS:                        14.0   8.48  )-----------( 178      ( 2020 05:52 )             43.5       COUMADIN:  Yes/No. REASON: .    PT/INR - ( 2020 21:14 )   PT: 12.3 sec;   INR: 1.08          PTT - ( 2020 21:14 )  PTT:31.5 sec        142  |  105  |  22  ----------------------------<  112<H>  4.9   |  24  |  0.97    Ca    8.6      2020 05:52  Phos  2.6       Mg     2.0         TPro  7.0  /  Alb  3.4  /  TBili  0.3  /  DBili  x   /  AST  36  /  ALT  40  /  AlkPhos  81  12      Urinalysis Basic - ( 2020 21:54 )    Color: Yellow / Appearance: Clear / S.020 / pH: x  Gluc: x / Ketone: Trace mg/dL  / Bili: Negative / Urobili: 0.2 E.U./dL   Blood: x / Protein: 100 mg/dL / Nitrite: NEGATIVE   Leuk Esterase: NEGATIVE / RBC: 5-10 /HPF / WBC < 5 /HPF   Sq Epi: x / Non Sq Epi: 0-5 /HPF / Bacteria: Present /HPF    MEDICATIONS  (STANDING):  amLODIPine   Tablet 5 milliGRAM(s) Oral daily  aspirin enteric coated 81 milliGRAM(s) Oral daily  atorvastatin 80 milliGRAM(s) Oral at bedtime  buDESOnide    Inhalation Suspension 0.5 milliGRAM(s) Inhalation two times a day  chlorhexidine 0.12% Liquid 10 milliLiter(s) Swish and Spit once  dextrose 5%. 1000 milliLiter(s) (50 mL/Hr) IV Continuous <Continuous>  dextrose 50% Injectable 12.5 Gram(s) IV Push once  dextrose 50% Injectable 25 Gram(s) IV Push once  dextrose 50% Injectable 25 Gram(s) IV Push once  famotidine    Tablet 20 milliGRAM(s) Oral daily  heparin  Injectable 5000 Unit(s) SubCutaneous every 8 hours  insulin lispro (HumaLOG) corrective regimen sliding scale   SubCutaneous three times a day before meals  isosorbide   mononitrate ER Tablet (IMDUR) 60 milliGRAM(s) Oral daily  lactated ringers. 500 milliLiter(s) (50 mL/Hr) IV Continuous <Continuous>  lidocaine   Patch 2 Patch Transdermal every 24 hours  metoprolol tartrate 25 milliGRAM(s) Oral two times a day  sodium chloride 0.9% lock flush 3 milliLiter(s) IV Push every 8 hours    MEDICATIONS  (PRN):  ALBUTerol    90 MICROgram(s) HFA Inhaler 2 Puff(s) Inhalation every 4 hours PRN Shortness of Breath and/or Wheezing  dextrose 40% Gel 15 Gram(s) Oral once PRN Blood Glucose LESS THAN 70 milliGRAM(s)/deciliter  glucagon  Injectable 1 milliGRAM(s) IntraMuscular once PRN Glucose LESS THAN 70 milligrams/deciliter    RADIOLOGY & ADDITIONAL TESTS:  < from: Xray Chest 1 View- PORTABLE-Routine (20 @ 20:35) >  Impression:    Focal atelectasis/infiltrate right upper lobe has improved compared to prior outside exam from [facility]. Scattered increased lung markings are present and suspicious for pulmonary vascular congestion. No pleural effusion. No pneumothorax. Mild hypoinflation. No acute bone abnormality.      < from: NM PET/CT Onc FDG Skull to Thigh, Inital (20 @ 17:17) >  Impression:     FDG avid right hilar mass corresponding to findings on recent CT chest suspicious for malignancy. FDG avid right lower paratracheal and prevascular lymph nodes suspicious for malignancy.     FDG avid bilateral hilar lymph nodes which are in a pattern typical of inflammatory/infectious etiology although given the presence of a right hilar mass neoplasm cannot be excluded.    Focal uptake within the right palatine tonsil which is nonspecific, correlation with direct visualization is recommended.    < from: MR Head w/wo IV Cont (20 @ 14:16) >  IMPRESSION:     Markedly motion degraded study. While there is no large mass or enhancing focus in the brain, small sites of metastatic disease are not excluded.    A punctate focus of signal in the right superior thalamus is questionable for recent lacunar infarction. There is background of moderate small vessel ischemic change in cerebral white matter. Occlusion versus slow flow of the right vertebral artery.    A/P:    Mr. Richards is an 79 y.o current smoker (1-2 PPD X67 years) male with a PMHx of COPD, PVD s/p right BKA, s/p fem/popliteal bilateral bypass, diastolic CHF, CAD, PCI , DMII (insulin pump) who was taken via ambulance to [facility] ED with SOB and chest pain and was admitted for community acquired pneumonia. A CT scan completed there showed right sided middle lobe lung mass suspicious for malignancy and upon further workup he was diagnosed with an NSTEMI. Stress testing was positive and cardiac catheterization demonstrated mid LAD stenosis (70%). The patient was transferred to [facility] under the care of Dr. Saez for evaluation and management. Cardiac intervention is now held pending further workup of lung mass.    Neurovascular:   - No delirium. Pain well controlled with current regimen.  - No indication for pain medication at this time    Cardiovascular:   - Admitted under care for Dr. Saez with plan for MIDCAB, cancelled pending further workup of lung mass  - Cardiology consulted for high risk anesthesia, recommend BB 25 metoprolol BID and increasing atorvastatin to 80 mg. Will continue to appreciate recs  -Hemodynamically stable. HR controlled (67-83)  - Hx of HTN, BP controlled (143//84).  - Pt with 5 second pause overnight, BB held this morning, follow up EP recs  - Continue amlodipine 5 mg daily  - Hx of CAD, continue to hold plavix pending intervention, continue with ASA 81   - Continue to monitor EKG    Respiratory:   - Undergoing workup for right lung mass found on chest CT, underwent           - Cardiology and cardiac anesthesia following for high risk general anesthesia, will appreciate recs   - Tentative plan for IP biopsy of lung mass Tuesday, follow up plan  - 02 Sat = 94% on 2L NC  -Encourage C+DB and Use of IS 10x / hr while awake.  - Continue to monitor CXR     GI:   - Stable.  -Continue GI PPX with protonix  -PO Diet to resume after PET CT     Renal / :  - BUN/Cr stable at 34/1.30  -Continue to monitor renal function.  -Monitor I/O's.  - Replete electrolytes PRN    Endocrine:    -A1c 7.6, continue insulin sliding scale   -TSH 0.858, T4 8.34, no known hx of thyroid disease    Hematologic:  -H/H stable at 13.5/41.0 with no obvious signs of bleeding  -Coagulation Panel.    ID:  -Pt remains afebrile with no elevation in WBC or signs of infection  -Continue to monitor CBC  -Observe for SIRS/Sepsis Syndrome.    Prophylaxis:  -DVT prophylaxis with 5000 SubQ Heparin q8h.  -SCD's    Disposition:  -Floor for continued workup and management planning Patient discussed on morning rounds with Dr. Saez    Operation / Date: No cardiac intervention planned at this time     SUBJECTIVE ASSESSMENT:  79y Male assessed at bedside today. Patient states he feels well today. He denies chest pain (last episode last Thursday). He denies SOB. He has not had a bowel movement while admitted but endorses flatus. He continues to use IS (1500 ml) and ambulate on the floor independently. He denies fever, chills, nausea, vomiting.     Vital Signs Last 24 Hrs  T(C): 36.5 (2020 09:17), Max: 36.8 (2020 21:57)  T(F): 97.7 (2020 09:17), Max: 98.3 (2020 21:57)  HR: 95 (2020 12:04) (67 - 95)  BP: 143/70 (2020 12:04) (143/70 - 167/70)  BP(mean): 99 (2020 12:04) (99 - 114)  RR: 18 (2020 12:04) (15 - 18)  SpO2: 98% (2020 12:04) (90% - 98%)  I&O's Detail    2020 07:01  -  2020 07:00  --------------------------------------------------------  IN:    lactated ringers.: 250 mL  Total IN: 250 mL    OUT:    Voided: 850 mL  Total OUT: 850 mL    Total NET: -600 mL    CHEST TUBE:  No  MAX DRAIN:  No.  EPICARDIAL WIRES: No.  TIE DOWNS: No.  CASTRO: No.    PHYSICAL EXAM:  General: Well appearing, in NAD assessed laying comfortably in bed  Neurological: A&OX3, no focal deficits noted. Moving bilateral upper and lower extremities.   Cardiovascular: RRR, no murmurs, rubs, gallops  Respiratory: Clear to auscultation bilateral posterior lung fields. No wheezes, rales, rhonchi.  Gastrointestinal: +BS, NT/ND  Extremities: No peripheral edema, no calf tenderness. Full strength and ROM bilateral upper and lower extremities. Right sided BKA    Vascular: Bilateral distal pulses 2+  Incision Sites: None    LABS:                        14.0   8.48  )-----------( 178      ( 2020 05:52 )             43.5       COUMADIN: No. .    PT/INR - ( 2020 21:14 )   PT: 12.3 sec;   INR: 1.08          PTT - ( 2020 21:14 )  PTT:31.5 sec        142  |  105  |  22  ----------------------------<  112<H>  4.9   |  24  |  0.97    Ca    8.6      2020 05:52  Phos  2.6       Mg     2.0         TPro  7.0  /  Alb  3.4  /  TBili  0.3  /  DBili  x   /  AST  36  /  ALT  40  /  AlkPhos  81  02-12      Urinalysis Basic - ( 2020 21:54 )    Color: Yellow / Appearance: Clear / S.020 / pH: x  Gluc: x / Ketone: Trace mg/dL  / Bili: Negative / Urobili: 0.2 E.U./dL   Blood: x / Protein: 100 mg/dL / Nitrite: NEGATIVE   Leuk Esterase: NEGATIVE / RBC: 5-10 /HPF / WBC < 5 /HPF   Sq Epi: x / Non Sq Epi: 0-5 /HPF / Bacteria: Present /HPF    MEDICATIONS  (STANDING):  amLODIPine   Tablet 5 milliGRAM(s) Oral daily  aspirin enteric coated 81 milliGRAM(s) Oral daily  atorvastatin 80 milliGRAM(s) Oral at bedtime  buDESOnide    Inhalation Suspension 0.5 milliGRAM(s) Inhalation two times a day  chlorhexidine 0.12% Liquid 10 milliLiter(s) Swish and Spit once  dextrose 5%. 1000 milliLiter(s) (50 mL/Hr) IV Continuous <Continuous>  dextrose 50% Injectable 12.5 Gram(s) IV Push once  dextrose 50% Injectable 25 Gram(s) IV Push once  dextrose 50% Injectable 25 Gram(s) IV Push once  famotidine    Tablet 20 milliGRAM(s) Oral daily  heparin  Injectable 5000 Unit(s) SubCutaneous every 8 hours  insulin lispro (HumaLOG) corrective regimen sliding scale   SubCutaneous three times a day before meals  isosorbide   mononitrate ER Tablet (IMDUR) 60 milliGRAM(s) Oral daily  lactated ringers. 500 milliLiter(s) (50 mL/Hr) IV Continuous <Continuous>  lidocaine   Patch 2 Patch Transdermal every 24 hours  metoprolol tartrate 25 milliGRAM(s) Oral two times a day  sodium chloride 0.9% lock flush 3 milliLiter(s) IV Push every 8 hours    MEDICATIONS  (PRN):  ALBUTerol    90 MICROgram(s) HFA Inhaler 2 Puff(s) Inhalation every 4 hours PRN Shortness of Breath and/or Wheezing  dextrose 40% Gel 15 Gram(s) Oral once PRN Blood Glucose LESS THAN 70 milliGRAM(s)/deciliter  glucagon  Injectable 1 milliGRAM(s) IntraMuscular once PRN Glucose LESS THAN 70 milligrams/deciliter    RADIOLOGY & ADDITIONAL TESTS:  < from: Xray Chest 1 View- PORTABLE-Routine (20 @ 20:35) >  Impression:    Focal atelectasis/infiltrate right upper lobe has improved compared to prior outside exam from [facility]. Scattered increased lung markings are present and suspicious for pulmonary vascular congestion. No pleural effusion. No pneumothorax. Mild hypoinflation. No acute bone abnormality.      < from: NM PET/CT Onc FDG Skull to Thigh, Inital (20 @ 17:17) >  Impression:     FDG avid right hilar mass corresponding to findings on recent CT chest suspicious for malignancy. FDG avid right lower paratracheal and prevascular lymph nodes suspicious for malignancy.     FDG avid bilateral hilar lymph nodes which are in a pattern typical of inflammatory/infectious etiology although given the presence of a right hilar mass neoplasm cannot be excluded.    Focal uptake within the right palatine tonsil which is nonspecific, correlation with direct visualization is recommended.    < from: MR Head w/wo IV Cont (20 @ 14:16) >  IMPRESSION:     Markedly motion degraded study. While there is no large mass or enhancing focus in the brain, small sites of metastatic disease are not excluded.    A punctate focus of signal in the right superior thalamus is questionable for recent lacunar infarction. There is background of moderate small vessel ischemic change in cerebral white matter. Occlusion versus slow flow of the right vertebral artery.    A/P:    Mr. Richards is an 79 y.o current smoker (1-2 PPD X67 years) male with a PMHx of COPD, PVD s/p right BKA, s/p fem/popliteal bilateral bypass, diastolic CHF, CAD, PCI , DMII (insulin pump) who was taken via ambulance to [facility] ED with SOB and chest pain and was admitted for community acquired pneumonia. A CT scan completed there showed right sided middle lobe lung mass suspicious for malignancy and upon further workup he was diagnosed with an NSTEMI. Stress testing was positive and cardiac catheterization demonstrated mid LAD stenosis (70%). The patient was transferred to [facility] under the care of Dr. Saez for evaluation and management. Cardiac intervention is now held pending further workup of lung mass.    Neurovascular:   - No delirium. Pain well controlled with current regimen.  - No indication for pain medication at this time    Cardiovascular:   - Admitted under care for Dr. Saez with plan for MIDCAB, cancelled pending further workup of lung mass  - Cardiology consulted for high risk anesthesia, recommend BB 25 metoprolol BID and increasing atorvastatin to 80 mg. Will continue to appreciate recs  -Hemodynamically stable. HR controlled (67-83)  - Hx of HTN, BP controlled (143//84).  - Pt with 5 second pause overnight, BB held this morning, follow up EP recs  - Continue amlodipine 5 mg daily  - Hx of CAD, continue to hold plavix pending intervention, continue with ASA 81   - Continue to monitor EKG    Respiratory:   - Undergoing workup for right lung mass found on chest CT, underwent PET and MRI yesterday  - Plan for IP biopsy Tuesday  - Plan for transfer to medicine in setting of no surgical intervention planned at this time   - Cardiology and cardiac anesthesia following for high risk general anesthesia, will appreciate recs   - 02 Sat = 94% on 2L NC  -Encourage C+DB and Use of IS 10x / hr while awake.  - Continue to monitor CXR     GI:   - Stable.  -Continue GI PPX with protonix  -PO Diet    Renal / :  - BUN/Cr stable at 22/0.97  - Continue to monitor renal function.  -Monitor I/O's.  - Replete electrolytes PRN    Endocrine:    -A1c 7.6, continue insulin sliding scale   -TSH 0.858, T4 8.34, no known hx of thyroid disease    Hematologic:  -H/H stable at 14.0/43.5 with no obvious signs of bleeding  -Coagulation Panel.    ID:  -Pt remains afebrile with no elevation in WBC or signs of infection  -Continue to monitor CBC  -Observe for SIRS/Sepsis Syndrome.    Prophylaxis:  -DVT prophylaxis with 5000 SubQ Heparin q8h.  -SCD's    Disposition:  -Transfer to medicine

## 2020-02-14 NOTE — DIETITIAN INITIAL EVALUATION ADULT. - ADD RECOMMEND
1. Reinforce ed prn 2. Add Cst Cho restriction 3. Trend wts 4. Encourage intake through day 5. Manage pain prn 6. Reinforce ed

## 2020-02-14 NOTE — PROGRESS NOTE ADULT - SUBJECTIVE AND OBJECTIVE BOX
Interventional Cardiology PA [facility]ach Stepover Note    CC: chest pain  Subjective Assessment:    Currently chest pain free    ROS otherwise negative except as stated in HPI and subjective   MEDICATIONS:  amLODIPine   Tablet 5 milliGRAM(s) Oral daily  isosorbide   mononitrate ER Tablet (IMDUR) 60 milliGRAM(s) Oral daily  metoprolol tartrate 25 milliGRAM(s) Oral two times a day  ALBUTerol    90 MICROgram(s) HFA Inhaler 2 Puff(s) Inhalation every 4 hours PRN  buDESOnide    Inhalation Suspension 0.5 milliGRAM(s) Inhalation two times a day  famotidine    Tablet 20 milliGRAM(s) Oral daily  atorvastatin 80 milliGRAM(s) Oral at bedtime  dextrose 40% Gel 15 Gram(s) Oral once PRN  dextrose 50% Injectable 12.5 Gram(s) IV Push once  dextrose 50% Injectable 25 Gram(s) IV Push once  dextrose 50% Injectable 25 Gram(s) IV Push once  glucagon  Injectable 1 milliGRAM(s) IntraMuscular once PRN  insulin lispro (HumaLOG) corrective regimen sliding scale   SubCutaneous three times a day before meals  aspirin enteric coated 81 milliGRAM(s) Oral daily  chlorhexidine 0.12% Liquid 10 milliLiter(s) Swish and Spit once  dextrose 5%. 1000 milliLiter(s) IV Continuous <Continuous>  heparin  Injectable 5000 Unit(s) SubCutaneous every 8 hours  lactated ringers. 500 milliLiter(s) IV Continuous <Continuous>  lidocaine   Patch 2 Patch Transdermal every 24 hours  sodium chloride 0.9% lock flush 3 milliLiter(s) IV Push every 8 hours  	  PHYSICAL EXAM:  TELEMETRY:   T(C): 37.1 (02-14-20 @ 17:39), Max: 37.1 (02-14-20 @ 17:39)  HR: 89 (02-14-20 @ 16:43) (67 - 95)  BP: 138/67 (02-14-20 @ 16:43) (138/67 - 167/70)  RR: 18 (02-14-20 @ 16:43) (15 - 18)  SpO2: 97% (02-14-20 @ 16:43) (90% - 98%)  Wt(kg): --  I&O's Summary    13 Feb 2020 07:01  -  14 Feb 2020 07:00  --------------------------------------------------------  IN: 250 mL / OUT: 850 mL / NET: -600 mL    14 Feb 2020 07:01  -  14 Feb 2020 18:44  --------------------------------------------------------  IN: 420 mL / OUT: 200 mL / NET: 220 mL    [place]: none  Central/PICC/Mid Line:  none                                       Appearance: Normal	  HEENT:   Normal oral mucosa, PERRL, EOMI	  Neck: Supple, - JVD  Cardiovascular: Normal S1 S2, No JVD, No murmurs,   Respiratory: End expiratory wheezing in right lung  Gastrointestinal:  Soft, Non-tender, + BS	  Skin: No rashes, No ecchymoses, No cyanosis  Extremities: right BKA, Allevyn dressing over stump.    Vascular: Peripheral pulses palpable 2+ bilaterally  Neurologic: Non-focal  Psychiatry: A & O x 3, Mood & affect appropriate    LABS:                       14.0   8.48  )-----------( 178      ( 14 Feb 2020 05:52 )             43.5     02-14    142  |  105  |  22  ----------------------------<  112<H>  4.9   |  24  |  0.97    Ca    8.6      14 Feb 2020 05:52  Phos  2.6     02-14  Mg     2.0     02-14    TPro  7.0  /  Alb  3.4  /  TBili  0.3  /  DBili  x   /  AST  36  /  ALT  40  /  AlkPhos  81  02-12  PT/INR - ( 12 Feb 2020 21:14 )   PT: 12.3 sec;   INR: 1.08       PTT - ( 12 Feb 2020 21:14 )  PTT:31.5 sec    ASSESSMENT/PLAN:

## 2020-02-14 NOTE — PROGRESS NOTE ADULT - PROBLEM SELECTOR PLAN 5
- Telemetry with a 5-sec pause during which there was AVB but the P-P intervals progressively lengthened. Also, the SD interval lengthens out before the pause. This is highly suggestive of high vagal tone during rest. LVEF 47% on echo. No h/o syncope.   - Continue tele. Ok to continue Metoprolol 25 mg bid. No indication for PPM.    VTE PPX: Hep SQ  Dispo: pending lung biopsy

## 2020-02-14 NOTE — PROGRESS NOTE ADULT - ATTENDING COMMENTS
telemetry reviewed sinus pause likely due to sleep apnea occurred during sleep  he is being transferred to cardiology would obtain the cath images and upload to the system would obtain stress test report from outside hospital in order to have further assessment of his CAD and intervention

## 2020-02-14 NOTE — PROGRESS NOTE ADULT - SUBJECTIVE AND OBJECTIVE BOX
SUBJECTIVE:    Pt feeling well overnight  Pt had sinus pause while sleeping    MEDICATIONS:  ALBUTerol    90 MICROgram(s) HFA Inhaler 2 Puff(s) Inhalation every 4 hours PRN  amLODIPine   Tablet 5 milliGRAM(s) Oral daily  aspirin enteric coated 81 milliGRAM(s) Oral daily  atorvastatin 80 milliGRAM(s) Oral at bedtime  buDESOnide    Inhalation Suspension 0.5 milliGRAM(s) Inhalation two times a day  chlorhexidine 0.12% Liquid 10 milliLiter(s) Swish and Spit once  dextrose 40% Gel 15 Gram(s) Oral once PRN  dextrose 5%. 1000 milliLiter(s) IV Continuous <Continuous>  dextrose 50% Injectable 12.5 Gram(s) IV Push once  dextrose 50% Injectable 25 Gram(s) IV Push once  dextrose 50% Injectable 25 Gram(s) IV Push once  famotidine    Tablet 20 milliGRAM(s) Oral daily  glucagon  Injectable 1 milliGRAM(s) IntraMuscular once PRN  heparin  Injectable 5000 Unit(s) SubCutaneous every 8 hours  insulin lispro (HumaLOG) corrective regimen sliding scale   SubCutaneous three times a day before meals  isosorbide   mononitrate ER Tablet (IMDUR) 60 milliGRAM(s) Oral daily  lactated ringers. 500 milliLiter(s) IV Continuous <Continuous>  lidocaine   Patch 2 Patch Transdermal every 24 hours  metoprolol tartrate 25 milliGRAM(s) Oral two times a day  sodium chloride 0.9% lock flush 3 milliLiter(s) IV Push every 8 hours  	    PHYSICAL EXAM:  T(C): 36.5 (02-14-20 @ 09:17), Max: 36.8 (02-13-20 @ 21:57)  HR: 95 (02-14-20 @ 12:04) (67 - 95)  BP: 143/70 (02-14-20 @ 12:04) (143/70 - 167/70)  RR: 18 (02-14-20 @ 12:04) (15 - 18)  SpO2: 98% (02-14-20 @ 12:04) (90% - 98%)  Wt(kg): --    GEN: Awake, comfortable. No acute distress.   HEENT: Moist mucous membranes.   Neck: No JVD   CV: RRR, Normal S1/S2.   RESP: Clear to auscultation bilaterally  ABD: Soft, Non-tender, Non-distended. Normoactive bowel sounds.   EXT: Warm, BKA on R       I&O's Summary    13 Feb 2020 07:01  -  14 Feb 2020 07:00  --------------------------------------------------------  IN: 250 mL / OUT: 850 mL / NET: -600 mL            LABS:	 	    CARDIAC MARKERS:                        14.0   8.48  )-----------( 178      ( 14 Feb 2020 05:52 )             43.5     02-14    142  |  105  |  22  ----------------------------<  112<H>  4.9   |  24  |  0.97    Ca    8.6      14 Feb 2020 05:52  Phos  2.6     02-14  Mg     2.0     02-14    TPro  7.0  /  Alb  3.4  /  TBili  0.3  /  DBili  x   /  AST  36  /  ALT  40  /  AlkPhos  81  02-12    proBNP:   Lipid Profile:   HgA1c:   TSH:   	    ECG:  	    TELEMETRY:  5 second sinus pause    ECHO:  1. Mildly reduced left ventricular systolic function.   2. Grade I left ventricular diastolic dysfunction.   3. Normal right ventricular size and systolic function.   4. Mild mitral regurgitation.   5. No evidence of pulmonary hypertension.   6. No pericardial effusion.      ASSESSMENT/PLAN:  Preoperative Evaluation:  - RCRI 3, 15% 30 day risk of death, MI, cardiac arrest  - Known ischemic CM that requires revascularization, however is on hold until lung mass is further evaluated.  - Pt is a High risk for Lung Biopsy which is a low risk procedure  - Would titrate up Metoprolol to 25mg PO BID, up from 12.5 BID  - Increase Lipitor to 80mg   - Pt currently appears euvolemic, without electrical instability or active ischemia  - Please check daily EKG**    Sinus Pause:  - Occurred while sleeping, no other evidence of pause on telemetry  - Would recommend titrate back down to Metoprolol 12.5mg BID  - out pt sleep study, MEET for possible etiology  - EP consulted, will appreciate any further rec's SUBJECTIVE:    Pt feeling well overnight  Pt had sinus pause while sleeping    MEDICATIONS:  ALBUTerol    90 MICROgram(s) HFA Inhaler 2 Puff(s) Inhalation every 4 hours PRN  amLODIPine   Tablet 5 milliGRAM(s) Oral daily  aspirin enteric coated 81 milliGRAM(s) Oral daily  atorvastatin 80 milliGRAM(s) Oral at bedtime  buDESOnide    Inhalation Suspension 0.5 milliGRAM(s) Inhalation two times a day  chlorhexidine 0.12% Liquid 10 milliLiter(s) Swish and Spit once  dextrose 40% Gel 15 Gram(s) Oral once PRN  dextrose 5%. 1000 milliLiter(s) IV Continuous <Continuous>  dextrose 50% Injectable 12.5 Gram(s) IV Push once  dextrose 50% Injectable 25 Gram(s) IV Push once  dextrose 50% Injectable 25 Gram(s) IV Push once  famotidine    Tablet 20 milliGRAM(s) Oral daily  glucagon  Injectable 1 milliGRAM(s) IntraMuscular once PRN  heparin  Injectable 5000 Unit(s) SubCutaneous every 8 hours  insulin lispro (HumaLOG) corrective regimen sliding scale   SubCutaneous three times a day before meals  isosorbide   mononitrate ER Tablet (IMDUR) 60 milliGRAM(s) Oral daily  lactated ringers. 500 milliLiter(s) IV Continuous <Continuous>  lidocaine   Patch 2 Patch Transdermal every 24 hours  metoprolol tartrate 25 milliGRAM(s) Oral two times a day  sodium chloride 0.9% lock flush 3 milliLiter(s) IV Push every 8 hours  	    PHYSICAL EXAM:  T(C): 36.5 (02-14-20 @ 09:17), Max: 36.8 (02-13-20 @ 21:57)  HR: 95 (02-14-20 @ 12:04) (67 - 95)  BP: 143/70 (02-14-20 @ 12:04) (143/70 - 167/70)  RR: 18 (02-14-20 @ 12:04) (15 - 18)  SpO2: 98% (02-14-20 @ 12:04) (90% - 98%)  Wt(kg): --    GEN: Awake, comfortable. No acute distress.   HEENT: Moist mucous membranes.   Neck: No JVD   CV: RRR, Normal S1/S2.   RESP: Clear to auscultation bilaterally  ABD: Soft, Non-tender, Non-distended. Normoactive bowel sounds.   EXT: Warm, BKA on R       I&O's Summary    13 Feb 2020 07:01  -  14 Feb 2020 07:00  --------------------------------------------------------  IN: 250 mL / OUT: 850 mL / NET: -600 mL            LABS:	 	    CARDIAC MARKERS:                        14.0   8.48  )-----------( 178      ( 14 Feb 2020 05:52 )             43.5     02-14    142  |  105  |  22  ----------------------------<  112<H>  4.9   |  24  |  0.97    Ca    8.6      14 Feb 2020 05:52  Phos  2.6     02-14  Mg     2.0     02-14    TPro  7.0  /  Alb  3.4  /  TBili  0.3  /  DBili  x   /  AST  36  /  ALT  40  /  AlkPhos  81  02-12    proBNP:   Lipid Profile:   HgA1c:   TSH:   	    ECG:  	    TELEMETRY:  5 second sinus pause    ECHO:  1. Mildly reduced left ventricular systolic function.   2. Grade I left ventricular diastolic dysfunction.   3. Normal right ventricular size and systolic function.   4. Mild mitral regurgitation.   5. No evidence of pulmonary hypertension.   6. No pericardial effusion.      ASSESSMENT/PLAN:  Preoperative Evaluation:  - RCRI 3, 15% 30 day risk of death, MI, cardiac arrest  - Known ischemic CM that requires revascularization, however is on hold until lung mass is further evaluated.  - Pt is a High risk for Lung Biopsy which is a low risk procedure  - Metoprolol 12.5 BID  - Increase Lipitor to 80mg   - Pt currently appears euvolemic, without electrical instability or active ischemia  - Please check daily EKG**    Sinus Pause:  - Occurred while sleeping, no other evidence of pause on telemetry  - Would recommend titrate back down to Metoprolol 12.5mg BID  - out pt sleep study, MEET for possible etiology  - EP consulted, will appreciate any further rec's SUBJECTIVE:    Pt feeling well overnight  Pt had sinus pause while sleeping    MEDICATIONS:  ALBUTerol    90 MICROgram(s) HFA Inhaler 2 Puff(s) Inhalation every 4 hours PRN  amLODIPine   Tablet 5 milliGRAM(s) Oral daily  aspirin enteric coated 81 milliGRAM(s) Oral daily  atorvastatin 80 milliGRAM(s) Oral at bedtime  buDESOnide    Inhalation Suspension 0.5 milliGRAM(s) Inhalation two times a day  chlorhexidine 0.12% Liquid 10 milliLiter(s) Swish and Spit once  dextrose 40% Gel 15 Gram(s) Oral once PRN  dextrose 5%. 1000 milliLiter(s) IV Continuous <Continuous>  dextrose 50% Injectable 12.5 Gram(s) IV Push once  dextrose 50% Injectable 25 Gram(s) IV Push once  dextrose 50% Injectable 25 Gram(s) IV Push once  famotidine    Tablet 20 milliGRAM(s) Oral daily  glucagon  Injectable 1 milliGRAM(s) IntraMuscular once PRN  heparin  Injectable 5000 Unit(s) SubCutaneous every 8 hours  insulin lispro (HumaLOG) corrective regimen sliding scale   SubCutaneous three times a day before meals  isosorbide   mononitrate ER Tablet (IMDUR) 60 milliGRAM(s) Oral daily  lactated ringers. 500 milliLiter(s) IV Continuous <Continuous>  lidocaine   Patch 2 Patch Transdermal every 24 hours  metoprolol tartrate 25 milliGRAM(s) Oral two times a day  sodium chloride 0.9% lock flush 3 milliLiter(s) IV Push every 8 hours  	    PHYSICAL EXAM:  T(C): 36.5 (02-14-20 @ 09:17), Max: 36.8 (02-13-20 @ 21:57)  HR: 95 (02-14-20 @ 12:04) (67 - 95)  BP: 143/70 (02-14-20 @ 12:04) (143/70 - 167/70)  RR: 18 (02-14-20 @ 12:04) (15 - 18)  SpO2: 98% (02-14-20 @ 12:04) (90% - 98%)  Wt(kg): --    GEN: Awake, comfortable. No acute distress.   HEENT: Moist mucous membranes.   Neck: No JVD   CV: RRR, Normal S1/S2.   RESP: Clear to auscultation bilaterally  ABD: Soft, Non-tender, Non-distended. Normoactive bowel sounds.   EXT: Warm, ALEYDA on R       I&O's Summary    13 Feb 2020 07:01  -  14 Feb 2020 07:00  --------------------------------------------------------  IN: 250 mL / OUT: 850 mL / NET: -600 mL            LABS:	 	    CARDIAC MARKERS:                        14.0   8.48  )-----------( 178      ( 14 Feb 2020 05:52 )             43.5     02-14    142  |  105  |  22  ----------------------------<  112<H>  4.9   |  24  |  0.97    Ca    8.6      14 Feb 2020 05:52  Phos  2.6     02-14  Mg     2.0     02-14    TPro  7.0  /  Alb  3.4  /  TBili  0.3  /  DBili  x   /  AST  36  /  ALT  40  /  AlkPhos  81  02-12    proBNP:   Lipid Profile:   HgA1c:   TSH:   	    ECG:  	    TELEMETRY:  5 second sinus pause    ECHO:  1. Mildly reduced left ventricular systolic function.   2. Grade I left ventricular diastolic dysfunction.   3. Normal right ventricular size and systolic function.   4. Mild mitral regurgitation.   5. No evidence of pulmonary hypertension.   6. No pericardial effusion.      ASSESSMENT/PLAN:  79 y.o current smoker, pmh copd, pvd, R bka, fempop bypass, diastolicCHF, CAD, PCI 2005, DM2 on insulin pump presented initially with NSTEMI w/ 70% mLAD and R middle lobe lung mass  Cardiology called for preoperative evaluation:    Preoperative Evaluation:  - RCRI 3, 15% 30 day risk of death, MI, cardiac arrest  - Known ischemic CM that requires revascularization, however is on hold until lung mass is further evaluated.  - Pt is a High risk for Lung Biopsy which is a low risk procedure  - Lipitor to 80mg   - Pt currently appears euvolemic, without electrical instability or active ischemia  - Please order EKG    CAD  - Continue ASA and Lipitor  - Plavix currently on hold prior to lung biopsy,   - Would recommend reinstitution of Plavix once all procedures completed, for treatment of recent NSTEMI    Sinus Pause:  - Occurred while sleeping, no other evidence of pause on telemetry  - out pt sleep study, MEET for possible etiology  - EP consulted, will appreciate any further rec's  - Currently holding Metoprolol    HTN:  Well controlled, norvasc 5mg

## 2020-02-14 NOTE — CONSULT NOTE ADULT - ATTENDING COMMENTS
79M w/ DM2 on insulin pump, CAD s/p PCI, HFpEF, PAD s/p fem-pop bypass, COPD on RA, recent NSTEMI, RML lung mass c/f CA pending tissue biopsy here for CT surgery evaluation. Medical consult service called to evaluate COPD and DM.    #CCOPD 79M w/ DM2 on insulin pump, CAD s/p PCI, HFpEF, PAD s/p fem-pop bypass, COPD on RA, recent NSTEMI, RML lung mass c/f CA pending tissue biopsy here for CT surgery evaluation. Medical consult service called to evaluate COPD and DM.    Pt on breathing tx. States he feels his breathing is nml. At home on anoro-ellipta and PRN ventolin. Denies any cough, fever, chest pain. Regarding DM, has been using insulin pump for several years and removed it prior to coming to [facility]. Exam: nml effort, good breath sounds b/l wo wheezing or rales.     #COPD - does not appear in acute exacerbation  #DM2 - Appears at target on mod SSI  #CAD s/p PCI, recent NSTEMI  #RML Lung mass  #HFpEF     Plan  Since anoro-ellipta is LABA-LAMA, would recommending starting YUMIKO/ANGELICA w/ duoneb standing q6h plus PRN albuterol nebulizer  Can continue BID ICS  c/w Sliding Scale

## 2020-02-14 NOTE — PROGRESS NOTE ADULT - PROBLEM SELECTOR PLAN 2
- Not currently surgical candidate for cardiac intervention  - 5 second pause overnight, EP following, continue to appreciate recs  - Hold plavix prior to biopsy, continue with ASA  - High risk patient for general anesthesiology, cardiac anesthesia aware of planned procedure Thursday, follow up plan  - Continue atorvastatin 80 mg daily

## 2020-02-14 NOTE — PROGRESS NOTE ADULT - PROBLEM SELECTOR PLAN 1
- MRI brain and PET CT completed 02/13/2020  - MRI: No evidence brain mets but not conclusive 2/2 patient motion  - PET CT: FDG avid right hilar mass, FGD avid right lower paratracheal and prevascular LN, bilateral hilar LN, palatine tonsil  - Plan for Interventional Pulmonology biopsy on Tuesday   - Continue to hold Plavix prior to biopsy

## 2020-02-14 NOTE — PROGRESS NOTE ADULT - ASSESSMENT
Mr. Richards is an 79 y.o current smoker (1-2 PPD X67 years) male with a PMHx of COPD, PVD s/p right BKA, s/p fem/popliteal bilateral bypass, diastolic CHF, CAD, PCI 2005, DMII (insulin pump) who was taken via ambulance to [facility] ED with SOB and chest pain and was admitted for community acquired pneumonia. A CT scan completed there showed right sided middle lobe lung mass suspicious for malignancy and upon further workup he was diagnosed with an NSTEMI. Stress testing was positive and cardiac catheterization demonstrated mid LAD stenosis (70%). The patient was transferred to [facility] under the care of Dr. Saez for evaluation and management. Cardiac intervention is now held pending further workup of lung mass.

## 2020-02-14 NOTE — CONSULT NOTE ADULT - SUBJECTIVE AND OBJECTIVE BOX
Electrophysiology Consult Note:     CHIEF COMPLAINT:  Patient is a 79y old  Male who presents with a chief complaint of "I had trouble breathing". (2020 13:38)        HISTORY OF PRESENT ILLNESS:   80 y/o male smoker with history of COPD, DMII (insulin pump), PVA s/p Right BKA (2017), s/p fem/pop bypass bilateral?, diastolic CHF, CAD, PCI , who presented to [facility] initially with c/o SOB for about 1 week.  Found to have Rt mid lobe lung mass suspicious for malignancy on CT.  Also ruled in for NSTEMI s/p diagnostic cath at [facility] showing mLAD 75% stenosis.  Transferred to [facility] for surgical eval.  CABG surgery postponed while working up for lung cancer; awaiting for lung biopsy next week.  Meanwhile, telemetry here has been showing NSR with a 5.3sec pause on 20 at 5:25 AM.  Pt was in bed and states that he wasn't aware of any symptom.  He is on Lopressor 25 mg bid.  He denies history of syncope or near-syncope.  He is wheelchair bound due to Rt BKA.        PAST MEDICAL & SURGICAL HISTORY:  Hyperlipemia  Mass of right lung  Diastolic CHF, chronic  HTN (hypertension)  DM (diabetes mellitus)  PVD (peripheral vascular disease)  CAD in native artery  Amputation of leg  H/O exploratory laparotomy  S/P femoral-popliteal bypass surgery  H/O heart artery stent      FAMILY HISTORY:  No pertinent family history in first degree relatives      SOCIAL HISTORY:    current smoker 1-2 PPD x 67 years     Allergies:  No Known Allergies      MEDICATIONS  (STANDING):  amLODIPine   Tablet 5 milliGRAM(s) Oral daily  aspirin enteric coated 81 milliGRAM(s) Oral daily  atorvastatin 80 milliGRAM(s) Oral at bedtime  buDESOnide    Inhalation Suspension 0.5 milliGRAM(s) Inhalation two times a day  famotidine    Tablet 20 milliGRAM(s) Oral daily  heparin  Injectable 5000 Unit(s) SubCutaneous every 8 hours  insulin lispro (HumaLOG) corrective regimen sliding scale   SubCutaneous three times a day before meals  isosorbide   mononitrate ER Tablet (IMDUR) 60 milliGRAM(s) Oral daily  lactated ringers. 500 milliLiter(s) (50 mL/Hr) IV Continuous <Continuous>  lidocaine   Patch 2 Patch Transdermal every 24 hours  metoprolol tartrate 25 milliGRAM(s) Oral two times a day  sodium chloride 0.9% lock flush 3 milliLiter(s) IV Push every 8 hours    MEDICATIONS  (PRN):  ALBUTerol    90 MICROgram(s) HFA Inhaler 2 Puff(s) Inhalation every 4 hours PRN Shortness of Breath and/or Wheezing  dextrose 40% Gel 15 Gram(s) Oral once PRN Blood Glucose LESS THAN 70 milliGRAM(s)/deciliter  glucagon  Injectable 1 milliGRAM(s) IntraMuscular once PRN Glucose LESS THAN 70 milligrams/deciliter        REVIEW OF SYSTEMS:  CONSTITUTIONAL: No fever, weight loss, or fatigue  EYES: No eye pain, visual disturbances, or discharge  ENMT:  No difficulty hearing, tinnitus, vertigo; No sinus or throat pain  NECK: No pain or stiffness  BREASTS: No pain, masses, or nipple discharge  RESPIRATORY: No cough, wheezing, chills or hemoptysis; No Shortness of Breath  CARDIOVASCULAR: No chest pain, palpitations, dizziness, or leg swelling  GASTROINTESTINAL: No abdominal or epigastric pain. No nausea, vomiting, or hematemesis; No diarrhea or constipation. No melena or hematochezia.  GENITOURINARY: No dysuria, frequency, hematuria, or incontinence  NEUROLOGICAL: No headaches, memory loss, loss of strength, numbness, or tremors  SKIN: No itching, burning, rashes, or lesions   LYMPH Nodes: No enlarged glands  ENDOCRINE: No heat or cold intolerance; No hair loss  MUSCULOSKELETAL: No joint pain or swelling; No muscle, back, or extremity pain  PSYCHIATRIC: No depression, anxiety, mood swings, or difficulty sleeping  HEME/LYMPH: No easy bruising, or bleeding gums  ALLERY AND IMMUNOLOGIC: No hives or eczema	      PHYSICAL EXAM:  Vital Signs Last 24 Hrs  T(C): 36.9 (2020 13:34), Max: 36.9 (2020 13:34)  T(F): 98.4 (2020 13:34), Max: 98.4 (2020 13:34)  HR: 95 (2020 12:04) (67 - 95)  BP: 143/70 (2020 12:04) (143/70 - 167/70)  BP(mean): 99 (2020 12:04) (99 - 114)  RR: 18 (2020 12:04) (15 - 18)  SpO2: 98% (2020 12:04) (90% - 98%)  Daily     Daily Weight in k (2020 13:11)    Constitutional: NAD	  HEENT:   Normal oral mucosa, PERRL, EOMI	  Neck: No JVD  CVS: Normal S1 / S2, RRR, No murmurs  Pulm: CTA. No wheeze or rale  GI:  + BS, soft, NT / ND   Ext: No LE edema  Vascular: Peripheral pulses palpable 2+ bilaterally  MS: full range of motion in all joints  Neurologic: A&O x 3, Non-focal  Psych: Pleasant, has good insight  Skin: No rash or lesion       	  LABS:	                         14.0   8.48  )-----------( 178      ( 2020 05:52 )             43.5     02-14    142  |  105  |  22  ----------------------------<  112<H>  4.9   |  24  |  0.97    Ca    8.6      2020 05:52  Phos  2.6     02-14  Mg     2.0     -14    TPro  7.0  /  Alb  3.4  /  TBili  0.3  /  DBili  x   /  AST  36  /  ALT  40  /  AlkPhos  81  02-12    EK20: NSR 79 bpm. Normal interval. CO 168ms. .  Qtc 488.  Left axis deviation   Telemetry: NSR 70s.  A 5-sec pause noted, in which the P-P intervals lengthened out during the pause. Sinus node resumed after the pause followed by a blocked PAC.      Echo: TTE Echo w/ Cont Complete (20 @ 08:53)    1. Mildly reduced left ventricular systolic function. LVEF 47%   2. Grade I left ventricular diastolic dysfunction.   3. Normal right ventricular size and systolic function.   4. Mild mitral regurgitation.   5. No evidence of pulmonary hypertension.   6. No pericardial effusion. Electrophysiology Consult Note:     CHIEF COMPLAINT:  Patient is a 79y old  Male who presents with a chief complaint of "I had trouble breathing". (2020 13:38)        HISTORY OF PRESENT ILLNESS:   80 y/o male smoker with history of COPD, DMII (insulin pump), PVA s/p Right BKA (2017), s/p fem/pop bypass bilateral?, diastolic CHF, CAD, PCI , who presented to [place] initially with c/o SOB for about 1 week.  Found to have Rt mid lobe lung mass suspicious for malignancy on CT.  Also ruled in for NSTEMI s/p diagnostic cath at [place] showing mLAD 75% stenosis.  Transferred to [facility] for surgical eval.  CABG surgery postponed while working up for lung cancer; awaiting for lung biopsy next week.  Meanwhile, telemetry here has been showing NSR with a 5.3sec pause on 20 at 5:25 AM.  Pt was in bed and states that he wasn't aware of any symptom.  He is on Lopressor 25 mg bid.  He denies history of syncope or near-syncope.  He is wheelchair bound due to Rt BKA.        PAST MEDICAL & SURGICAL HISTORY:  Hyperlipemia  Mass of right lung  Diastolic CHF, chronic  HTN (hypertension)  DM (diabetes mellitus)  PVD (peripheral vascular disease)  CAD in native artery  Amputation of leg  H/O exploratory laparotomy  S/P femoral-popliteal bypass surgery  H/O heart artery stent      FAMILY HISTORY:  No pertinent family history in first degree relatives      SOCIAL HISTORY:    current smoker 1-2 PPD x 67 years     Allergies:  No Known Allergies      MEDICATIONS  (STANDING):  amLODIPine   Tablet 5 milliGRAM(s) Oral daily  aspirin enteric coated 81 milliGRAM(s) Oral daily  atorvastatin 80 milliGRAM(s) Oral at bedtime  buDESOnide    Inhalation Suspension 0.5 milliGRAM(s) Inhalation two times a day  famotidine    Tablet 20 milliGRAM(s) Oral daily  heparin  Injectable 5000 Unit(s) SubCutaneous every 8 hours  insulin lispro (HumaLOG) corrective regimen sliding scale   SubCutaneous three times a day before meals  isosorbide   mononitrate ER Tablet (IMDUR) 60 milliGRAM(s) Oral daily  lactated ringers. 500 milliLiter(s) (50 mL/Hr) IV Continuous <Continuous>  lidocaine   Patch 2 Patch Transdermal every 24 hours  metoprolol tartrate 25 milliGRAM(s) Oral two times a day  sodium chloride 0.9% lock flush 3 milliLiter(s) IV Push every 8 hours    MEDICATIONS  (PRN):  ALBUTerol    90 MICROgram(s) HFA Inhaler 2 Puff(s) Inhalation every 4 hours PRN Shortness of Breath and/or Wheezing  dextrose 40% Gel 15 Gram(s) Oral once PRN Blood Glucose LESS THAN 70 milliGRAM(s)/deciliter  glucagon  Injectable 1 milliGRAM(s) IntraMuscular once PRN Glucose LESS THAN 70 milligrams/deciliter        REVIEW OF SYSTEMS:  CONSTITUTIONAL: No fever, weight loss, or fatigue  EYES: No eye pain, visual disturbances, or discharge  ENMT:  No difficulty hearing, tinnitus, vertigo; No sinus or throat pain  NECK: No pain or stiffness  BREASTS: No pain, masses, or nipple discharge  RESPIRATORY: No cough, wheezing, chills or hemoptysis; No Shortness of Breath  CARDIOVASCULAR: No chest pain, palpitations, dizziness, or leg swelling  GASTROINTESTINAL: No abdominal or epigastric pain. No nausea, vomiting, or hematemesis; No diarrhea or constipation. No melena or hematochezia.  GENITOURINARY: No dysuria, frequency, hematuria, or incontinence  NEUROLOGICAL: No headaches, memory loss, loss of strength, numbness, or tremors  SKIN: No itching, burning, rashes, or lesions   LYMPH Nodes: No enlarged glands  ENDOCRINE: No heat or cold intolerance; No hair loss  MUSCULOSKELETAL: No joint pain or swelling; No muscle, back, or extremity pain  PSYCHIATRIC: No depression, anxiety, mood swings, or difficulty sleeping  HEME/LYMPH: No easy bruising, or bleeding gums  ALLERY AND IMMUNOLOGIC: No hives or eczema	      PHYSICAL EXAM:  Vital Signs Last 24 Hrs  T(C): 36.9 (2020 13:34), Max: 36.9 (2020 13:34)  T(F): 98.4 (2020 13:34), Max: 98.4 (2020 13:34)  HR: 95 (2020 12:04) (67 - 95)  BP: 143/70 (2020 12:04) (143/70 - 167/70)  BP(mean): 99 (2020 12:04) (99 - 114)  RR: 18 (2020 12:04) (15 - 18)  SpO2: 98% (2020 12:04) (90% - 98%)  Daily Weight in k (2020 13:11)    Constitutional: NAD	  HEENT:   Normal oral mucosa, PERRL, EOMI	  Neck: No JVD  CVS: Normal S1 / S2, RRR, No murmurs  Pulm: decreased BS bilaterally. no wheeze  GI:  + BS, soft, NT / ND   Ext: No LE edema. Right BKA   Vascular: Peripheral pulses palpable 2+ bilaterally  MS: full range of motion in all joints  Neurologic: A&O x 3, Non-focal  Psych: Pleasant, has good insight  Skin: No rash or lesion       	  LABS:	                         14.0   8.48  )-----------( 178      ( 2020 05:52 )             43.5     02-14    142  |  105  |  22  ----------------------------<  112<H>  4.9   |  24  |  0.97    Ca    8.6      2020 05:52  Phos  2.6     02-14  Mg     2.0     -14    TPro  7.0  /  Alb  3.4  /  TBili  0.3  /  DBili  x   /  AST  36  /  ALT  40  /  AlkPhos  81  02-12    EK20: NSR 79 bpm. Normal interval. GA 168ms. .  Qtc 488.  Left axis deviation   Telemetry: NSR 70s.  A 5-sec pause noted, in which the P-P intervals lengthened out during the pause. Sinus node resumed after the pause followed by a blocked PAC.      Echo: TTE Echo w/ Cont Complete (20 @ 08:53)    1. Mildly reduced left ventricular systolic function. LVEF 47%   2. Grade I left ventricular diastolic dysfunction.   3. Normal right ventricular size and systolic function.   4. Mild mitral regurgitation.   5. No evidence of pulmonary hypertension.   6. No pericardial effusion. Electrophysiology Consult Note:     CHIEF COMPLAINT:  Patient is a 79y old  Male who presents with a chief complaint of "I had trouble breathing". (2020 13:38)        HISTORY OF PRESENT ILLNESS:   78 y/o male smoker with history of COPD, DMII (insulin pump), PVA s/p Right BKA (2017), s/p fem/pop bypass bilateral?, diastolic CHF, CAD, PCI , who presented to [place] initially with c/o SOB for about 1 week.  Found to have Rt mid lobe lung mass suspicious for malignancy on CT.  Also ruled in for NSTEMI s/p diagnostic cath at [place] showing mLAD 75% stenosis.  Transferred to [facility] for surgical eval.  CABG surgery postponed while working up for lung cancer; awaiting for lung biopsy next week.  Meanwhile, telemetry here has been showing NSR with a 5.3sec pause on 20 at 5:25 AM.  Pt was in bed and states that he wasn't aware of any symptom.  He is on Lopressor 25 mg bid.  He denies history of syncope or near-syncope.  He is wheelchair bound due to Rt BKA.        PAST MEDICAL & SURGICAL HISTORY:  Hyperlipemia  Mass of right lung  Diastolic CHF, chronic  HTN (hypertension)  DM (diabetes mellitus)  PVD (peripheral vascular disease)  CAD in native artery  Amputation of leg  H/O exploratory laparotomy  S/P femoral-popliteal bypass surgery  H/O heart artery stent      FAMILY HISTORY:  No pertinent family history in first degree relatives      SOCIAL HISTORY:    current smoker 1-2 PPD x 67 years     Allergies:  No Known Allergies      MEDICATIONS  (STANDING):  amLODIPine   Tablet 5 milliGRAM(s) Oral daily  aspirin enteric coated 81 milliGRAM(s) Oral daily  atorvastatin 80 milliGRAM(s) Oral at bedtime  buDESOnide    Inhalation Suspension 0.5 milliGRAM(s) Inhalation two times a day  famotidine    Tablet 20 milliGRAM(s) Oral daily  heparin  Injectable 5000 Unit(s) SubCutaneous every 8 hours  insulin lispro (HumaLOG) corrective regimen sliding scale   SubCutaneous three times a day before meals  isosorbide   mononitrate ER Tablet (IMDUR) 60 milliGRAM(s) Oral daily  lactated ringers. 500 milliLiter(s) (50 mL/Hr) IV Continuous <Continuous>  lidocaine   Patch 2 Patch Transdermal every 24 hours  metoprolol tartrate 25 milliGRAM(s) Oral two times a day  sodium chloride 0.9% lock flush 3 milliLiter(s) IV Push every 8 hours    MEDICATIONS  (PRN):  ALBUTerol    90 MICROgram(s) HFA Inhaler 2 Puff(s) Inhalation every 4 hours PRN Shortness of Breath and/or Wheezing  dextrose 40% Gel 15 Gram(s) Oral once PRN Blood Glucose LESS THAN 70 milliGRAM(s)/deciliter  glucagon  Injectable 1 milliGRAM(s) IntraMuscular once PRN Glucose LESS THAN 70 milligrams/deciliter        REVIEW OF SYSTEMS:  CONSTITUTIONAL: No fever, weight loss, or fatigue  EYES: No eye pain, visual disturbances, or discharge  ENMT:  No difficulty hearing, tinnitus, vertigo; No sinus or throat pain  NECK: No pain or stiffness  BREASTS: No pain, masses, or nipple discharge  RESPIRATORY: No cough, wheezing, chills or hemoptysis; No Shortness of Breath  CARDIOVASCULAR: No chest pain, palpitations, dizziness, or leg swelling  GASTROINTESTINAL: No abdominal or epigastric pain. No nausea, vomiting, or hematemesis; No diarrhea or constipation. No melena or hematochezia.  GENITOURINARY: No dysuria, frequency, hematuria, or incontinence  NEUROLOGICAL: No headaches, memory loss, loss of strength, numbness, or tremors  SKIN: No itching, burning, rashes, or lesions   LYMPH Nodes: No enlarged glands  ENDOCRINE: No heat or cold intolerance; No hair loss  MUSCULOSKELETAL: No joint pain or swelling; No muscle, back, or extremity pain  PSYCHIATRIC: No depression, anxiety, mood swings, or difficulty sleeping  HEME/LYMPH: No easy bruising, or bleeding gums  ALLERY AND IMMUNOLOGIC: No hives or eczema	      PHYSICAL EXAM:  Vital Signs Last 24 Hrs  T(C): 36.9 (2020 13:34), Max: 36.9 (2020 13:34)  T(F): 98.4 (2020 13:34), Max: 98.4 (2020 13:34)  HR: 95 (2020 12:04) (67 - 95)  BP: 143/70 (2020 12:04) (143/70 - 167/70)  BP(mean): 99 (2020 12:04) (99 - 114)  RR: 18 (2020 12:04) (15 - 18)  SpO2: 98% (2020 12:04) (90% - 98%)  Daily Weight in k (2020 13:11)    Constitutional: NAD	  HEENT:   Normal oral mucosa, PERRL, EOMI	  Neck: No JVD  CVS: Normal S1 / S2, RRR, No murmurs  Pulm: decreased BS bilaterally. no wheeze  GI:  + BS, soft, NT / ND   Ext: No LE edema. Right BKA   Vascular: Peripheral pulses palpable 2+ bilaterally  MS: full range of motion in all joints  Neurologic: A&O x 3, Non-focal  Psych: Pleasant, has good insight  Skin: No rash or lesion       	  LABS:	                         14.0   8.48  )-----------( 178      ( 2020 05:52 )             43.5     02-14    142  |  105  |  22  ----------------------------<  112<H>  4.9   |  24  |  0.97    Ca    8.6      2020 05:52  Phos  2.6     02-14  Mg     2.0     -14    TPro  7.0  /  Alb  3.4  /  TBili  0.3  /  DBili  x   /  AST  36  /  ALT  40  /  AlkPhos  81  02-12    EK20: NSR 79 bpm. Normal interval. HI 168ms. .  Qtc 488.  Left axis deviation   Telemetry: NSR 70s.  A 5-sec pause noted, in which the P-P intervals lengthened out during the pause. Sinus node resumed after the pause followed by a blocked PAC.  Also, the HI interval lengthens out before the pause.    Echo: TTE Echo w/ Cont Complete (20 @ 08:53)    1. Mildly reduced left ventricular systolic function. LVEF 47%   2. Grade I left ventricular diastolic dysfunction.   3. Normal right ventricular size and systolic function.   4. Mild mitral regurgitation.   5. No evidence of pulmonary hypertension.   6. No pericardial effusion.

## 2020-02-15 LAB
ALBUMIN SERPL ELPH-MCNC: 3.2 G/DL — LOW (ref 3.3–5)
ALP SERPL-CCNC: 75 U/L — SIGNIFICANT CHANGE UP (ref 40–120)
ALT FLD-CCNC: 53 U/L — HIGH (ref 10–45)
ANION GAP SERPL CALC-SCNC: 10 MMOL/L — SIGNIFICANT CHANGE UP (ref 5–17)
AST SERPL-CCNC: 43 U/L — HIGH (ref 10–40)
BILIRUB SERPL-MCNC: 0.4 MG/DL — SIGNIFICANT CHANGE UP (ref 0.2–1.2)
BUN SERPL-MCNC: 20 MG/DL — SIGNIFICANT CHANGE UP (ref 7–23)
CALCIUM SERPL-MCNC: 8.7 MG/DL — SIGNIFICANT CHANGE UP (ref 8.4–10.5)
CHLORIDE SERPL-SCNC: 103 MMOL/L — SIGNIFICANT CHANGE UP (ref 96–108)
CO2 SERPL-SCNC: 26 MMOL/L — SIGNIFICANT CHANGE UP (ref 22–31)
CREAT SERPL-MCNC: 1.18 MG/DL — SIGNIFICANT CHANGE UP (ref 0.5–1.3)
GLUCOSE BLDC GLUCOMTR-MCNC: 112 MG/DL — HIGH (ref 70–99)
GLUCOSE BLDC GLUCOMTR-MCNC: 140 MG/DL — HIGH (ref 70–99)
GLUCOSE BLDC GLUCOMTR-MCNC: 172 MG/DL — HIGH (ref 70–99)
GLUCOSE BLDC GLUCOMTR-MCNC: 236 MG/DL — HIGH (ref 70–99)
GLUCOSE SERPL-MCNC: 151 MG/DL — HIGH (ref 70–99)
HCT VFR BLD CALC: 41.3 % — SIGNIFICANT CHANGE UP (ref 39–50)
HGB BLD-MCNC: 13.6 G/DL — SIGNIFICANT CHANGE UP (ref 13–17)
MAGNESIUM SERPL-MCNC: 2 MG/DL — SIGNIFICANT CHANGE UP (ref 1.6–2.6)
MCHC RBC-ENTMCNC: 32.2 PG — SIGNIFICANT CHANGE UP (ref 27–34)
MCHC RBC-ENTMCNC: 32.9 GM/DL — SIGNIFICANT CHANGE UP (ref 32–36)
MCV RBC AUTO: 97.6 FL — SIGNIFICANT CHANGE UP (ref 80–100)
NRBC # BLD: 0 /100 WBCS — SIGNIFICANT CHANGE UP (ref 0–0)
PLATELET # BLD AUTO: 166 K/UL — SIGNIFICANT CHANGE UP (ref 150–400)
POTASSIUM SERPL-MCNC: 4.1 MMOL/L — SIGNIFICANT CHANGE UP (ref 3.5–5.3)
POTASSIUM SERPL-SCNC: 4.1 MMOL/L — SIGNIFICANT CHANGE UP (ref 3.5–5.3)
PROT SERPL-MCNC: 6.9 G/DL — SIGNIFICANT CHANGE UP (ref 6–8.3)
RBC # BLD: 4.23 M/UL — SIGNIFICANT CHANGE UP (ref 4.2–5.8)
RBC # FLD: 13.1 % — SIGNIFICANT CHANGE UP (ref 10.3–14.5)
SODIUM SERPL-SCNC: 139 MMOL/L — SIGNIFICANT CHANGE UP (ref 135–145)
WBC # BLD: 8.66 K/UL — SIGNIFICANT CHANGE UP (ref 3.8–10.5)
WBC # FLD AUTO: 8.66 K/UL — SIGNIFICANT CHANGE UP (ref 3.8–10.5)

## 2020-02-15 PROCEDURE — 93010 ELECTROCARDIOGRAM REPORT: CPT

## 2020-02-15 PROCEDURE — 99232 SBSQ HOSP IP/OBS MODERATE 35: CPT

## 2020-02-15 PROCEDURE — 99233 SBSQ HOSP IP/OBS HIGH 50: CPT | Mod: GC

## 2020-02-15 RX ADMIN — Medication 81 MILLIGRAM(S): at 10:40

## 2020-02-15 RX ADMIN — Medication 2: at 17:36

## 2020-02-15 RX ADMIN — SODIUM CHLORIDE 3 MILLILITER(S): 9 INJECTION INTRAMUSCULAR; INTRAVENOUS; SUBCUTANEOUS at 14:01

## 2020-02-15 RX ADMIN — HEPARIN SODIUM 5000 UNIT(S): 5000 INJECTION INTRAVENOUS; SUBCUTANEOUS at 22:46

## 2020-02-15 RX ADMIN — LIDOCAINE 2 PATCH: 4 CREAM TOPICAL at 19:02

## 2020-02-15 RX ADMIN — SODIUM CHLORIDE 3 MILLILITER(S): 9 INJECTION INTRAMUSCULAR; INTRAVENOUS; SUBCUTANEOUS at 21:30

## 2020-02-15 RX ADMIN — HEPARIN SODIUM 5000 UNIT(S): 5000 INJECTION INTRAVENOUS; SUBCUTANEOUS at 13:56

## 2020-02-15 RX ADMIN — SODIUM CHLORIDE 3 MILLILITER(S): 9 INJECTION INTRAMUSCULAR; INTRAVENOUS; SUBCUTANEOUS at 07:05

## 2020-02-15 RX ADMIN — Medication 4: at 13:56

## 2020-02-15 RX ADMIN — ATORVASTATIN CALCIUM 80 MILLIGRAM(S): 80 TABLET, FILM COATED ORAL at 22:34

## 2020-02-15 RX ADMIN — LIDOCAINE 2 PATCH: 4 CREAM TOPICAL at 10:37

## 2020-02-15 RX ADMIN — Medication 25 MILLIGRAM(S): at 17:39

## 2020-02-15 RX ADMIN — FAMOTIDINE 20 MILLIGRAM(S): 10 INJECTION INTRAVENOUS at 10:40

## 2020-02-15 RX ADMIN — LIDOCAINE 2 PATCH: 4 CREAM TOPICAL at 17:40

## 2020-02-15 RX ADMIN — Medication 0.5 MILLIGRAM(S): at 10:39

## 2020-02-15 RX ADMIN — ISOSORBIDE MONONITRATE 60 MILLIGRAM(S): 60 TABLET, EXTENDED RELEASE ORAL at 10:40

## 2020-02-15 RX ADMIN — HEPARIN SODIUM 5000 UNIT(S): 5000 INJECTION INTRAVENOUS; SUBCUTANEOUS at 07:03

## 2020-02-15 RX ADMIN — LIDOCAINE 2 PATCH: 4 CREAM TOPICAL at 07:04

## 2020-02-15 RX ADMIN — Medication 0.5 MILLIGRAM(S): at 22:34

## 2020-02-15 RX ADMIN — AMLODIPINE BESYLATE 5 MILLIGRAM(S): 2.5 TABLET ORAL at 07:04

## 2020-02-15 RX ADMIN — Medication 25 MILLIGRAM(S): at 07:04

## 2020-02-15 NOTE — PROGRESS NOTE ADULT - PROBLEM SELECTOR PLAN 3
- BPs 120s-150s/50s-70s.   - Continue Amlodipine 5mg daily, Metoprolol Tartrate 25mg BID, Imdur 60mg daily.

## 2020-02-15 NOTE — PROGRESS NOTE ADULT - ASSESSMENT
79M, current smoker, with PMH CAD s/p PCI (2005), IDDM, PVD s/p R BKA, s/p fem-pop bypass, COPD, HFpEF with NSTEMI, planned MIDCAB delayed given pulmonary mass pending biopsy with interventional pulmonology. Medicine consult service activated for recommendations regarding COPD and IDDM    1. COPD  - CXR 2/12 with b/l infiltrate  - Afebrile, VSS, does not meet SIRS criteria  - No antibiotics at this time  - Takes anoro ellipta and ventolin at home, if not available in house can continue on current pulmicort and proventil    2. DM  - A1C 7.6%, reasonable control  - C/w moderate does SSI  - F/u FSGs, glucose controlled at present  - Please change diet to consistent carb    3. CAD  - Pending MIDCAB, management as per cardiology team    4. CHF  - Management as per cardiology team

## 2020-02-15 NOTE — PROGRESS NOTE ADULT - SUBJECTIVE AND OBJECTIVE BOX
Interventional Cardiology PA Adult Progress Note    CC: chest pain and shortness of breath  Subjective Assessment:    Currently asymptomatic    ROS otherwise negative except as stated in HPI and subjective	  MEDICATIONS:  amLODIPine   Tablet 5 milliGRAM(s) Oral daily  isosorbide   mononitrate ER Tablet (IMDUR) 60 milliGRAM(s) Oral daily  metoprolol tartrate 25 milliGRAM(s) Oral two times a day  ALBUTerol    90 MICROgram(s) HFA Inhaler 2 Puff(s) Inhalation every 4 hours PRN  buDESOnide    Inhalation Suspension 0.5 milliGRAM(s) Inhalation two times a day  famotidine    Tablet 20 milliGRAM(s) Oral daily  atorvastatin 80 milliGRAM(s) Oral at bedtime  dextrose 40% Gel 15 Gram(s) Oral once PRN  dextrose 50% Injectable 12.5 Gram(s) IV Push once  dextrose 50% Injectable 25 Gram(s) IV Push once  dextrose 50% Injectable 25 Gram(s) IV Push once  glucagon  Injectable 1 milliGRAM(s) IntraMuscular once PRN  insulin lispro (HumaLOG) corrective regimen sliding scale   SubCutaneous three times a day before meals  aspirin enteric coated 81 milliGRAM(s) Oral daily  chlorhexidine 0.12% Liquid 10 milliLiter(s) Swish and Spit once  dextrose 5%. 1000 milliLiter(s) IV Continuous <Continuous>  heparin  Injectable 5000 Unit(s) SubCutaneous every 8 hours  lidocaine   Patch 2 Patch Transdermal every 24 hours  sodium chloride 0.9% lock flush 3 milliLiter(s) IV Push every 8 hours    PHYSICAL EXAM:  TELEMETRY: one dropped beat.   T(C): 36.1 (02-15-20 @ 09:15), Max: 37.1 (02-14-20 @ 17:39)  HR: 63 (02-15-20 @ 10:33) (59 - 89)  BP: 118/66 (02-15-20 @ 10:33) (118/66 - 138/67)  RR: 16 (02-15-20 @ 08:34) (16 - 18)  SpO2: 96% (02-15-20 @ 08:34) (91% - 97%)  Wt(kg): --  I&O's Summary    14 Feb 2020 07:01  -  15 Feb 2020 07:00  --------------------------------------------------------  IN: 740 mL / OUT: 675 mL / NET: 65 mL    [place]at: none  Central/PICC/Mid Line: none                                        Appearance: Normal	  HEENT:   Normal oral mucosa, PERRL, EOMI	  Neck: Supple, - JVD   Cardiovascular: Normal S1 S2, No JVD, No murmurs  Respiratory: Lungs clear to auscultation, b/l, No Rales, Rhonchi, Wheezing	  Gastrointestinal:  Soft, Non-tender, + BS	  Skin: No rashes, No ecchymoses, No cyanosis  Extremities: right BKA with allevyn dressing on stump. normal ROM left leg. Right femoral artery cardiac cath wound site stable  Vascular: Peripheral pulses palpable 2+ bilaterally  Neurologic: Non-focal  Psychiatry: A & O x 3, Mood & affect appropriate    LABS:                          13.6   8.66  )-----------( 166      ( 15 Feb 2020 10:58 )             41.3     02-15    139  |  103  |  20  ----------------------------<  151<H>  4.1   |  26  |  1.18    Ca    8.7      15 Feb 2020 10:58  Phos  2.6     02-14  Mg     2.0     02-15    TPro  6.9  /  Alb  3.2<L>  /  TBili  0.4  /  DBili  x   /  AST  43<H>  /  ALT  53<H>  /  AlkPhos  75  02-15    ASSESSMENT/PLAN:

## 2020-02-15 NOTE — PROGRESS NOTE ADULT - PROBLEM SELECTOR PLAN 2
- Not currently surgical candidate for cardiac intervention  - 5 second pause overnight 02/13/2020, EP consulted and stated pt has high vagal tone and it was a Mobitz type 1. Pt can continue taking Metoprolol.   - Hold plavix prior to biopsy, continue with Aspirin 81mg daily  - Continue Atorvastatin 80 mg daily - Not currently surgical candidate for cardiac intervention  - 5 second pause overnight 02/13/2020, EP consulted and stated pt has high vagal tone and it was a Mobitz type 1. Pt can continue taking Metoprolol.   - Hold Plavix prior to biopsy, continue with Aspirin 81mg daily  - Continue Atorvastatin 80 mg daily

## 2020-02-15 NOTE — PROGRESS NOTE ADULT - ASSESSMENT
80 y/o male, current smoker (1-2 PPD X67 years), with a PMHx of COPD, PVD s/p right BKA, s/p fem/popliteal bilateral bypass, chronic diastolic CHF, CAD s/p PCI 2005, DMII (insulin pump) who was taken via ambulance to [facility] ED with SOB and chest pain and was admitted for community acquired pneumonia. A CT scan completed there showed right sided middle lobe lung mass suspicious for malignancy and upon further workup he was diagnosed with an NSTEMI. Stress testing was positive and cardiac catheterization demonstrated mid LAD stenosis (70%). The patient was transferred to [facility] under the care of Dr. King Saez on [address] for evaluation and management. Cardiac intervention is now held pending further workup of lung mass. At [facility] pt had positive PET scan and MRI brain poor study 2/2 motion but negative for large masses. Interventional pulmonary team consulted for lung biopsy scheduled for Tuesday. While on 9 [place] pt had 5 second pause on telemetry, EP consulted and stated it was a Mobitz type 1 and pt had high vagal tone and pt can continue taking Metoprolol Tartrate. Pt was stepped down to [facility] [place] on 02/14/2020 PM for 24 hour tele monitoring. Pt stable on telemetry for 24 hours with one isolated dropped beat (Mobitz type 1). Pt stable for stepdown to medicine as per Dr Samaniego. 80 y/o male, current smoker (1-2 PPD X67 years), with a PMHx of COPD, PVD s/p right BKA, s/p fem/popliteal bilateral bypass, chronic diastolic CHF, CAD s/p PCI 2005, DMII (insulin pump) who was taken via ambulance to [facility] ED with SOB and chest pain and was admitted for community acquired pneumonia. A CT scan completed there showed right sided middle lobe lung mass suspicious for malignancy and upon further workup he was diagnosed with an NSTEMI. Stress testing was positive and cardiac catheterization demonstrated mid LAD stenosis (70%). The patient was transferred to [facility] under the care of Dr. King Saez on 9 [address] for evaluation and management. Cardiac intervention is now held pending further workup of lung mass. At [facility] pt had positive PET scan and MRI brain poor study 2/2 motion but negative for large masses. Interventional pulmonary team consulted for lung biopsy scheduled for Tuesday. While on 9 [address] pt had 5 second pause on telemetry, EP consulted and stated it was a Mobitz type 1 and pt had high vagal tone and pt can continue taking Metoprolol Tartrate. Pt was stepped down to [facility] [place] on 02/14/2020 PM for tele monitoring. Tele only showed one isolated dropped beat (Mobitz type 1) and Metoprolol will be continued. Pt will remain on 5 [place] until bronchoscopy on Tuesday 02/18/2020 with pulmonary team.

## 2020-02-15 NOTE — PROGRESS NOTE ADULT - PROBLEM SELECTOR PLAN 5
- Telemetry with a 5-sec pause during which there was AVB but the P-P intervals progressively lengthened. Also, the NC interval lengthens out before the pause. This is highly suggestive of high vagal tone during rest. LVEF 47% on echo. No h/o syncope.   - Continue tele. Ok to continue Metoprolol 25 mg bid. No indication for PPM.    VTE PPX: Hep SQ  Dispo: pending lung biopsy on Tuesday 02/18/2020, attempting to transfer pt to medicine - Telemetry with a 5-sec pause during which there was AVB but the P-P intervals progressively lengthened. Also, the WV interval lengthens out before the pause. This is highly suggestive of high vagal tone during rest. LVEF 47% on echo. No h/o syncope.   - Continue tele. Ok to continue Metoprolol 25 mg bid. No indication for PPM.    VTE PPX: Hep SQ  Dispo: pending lung biopsy on Tuesday 02/18/2020

## 2020-02-15 NOTE — PROGRESS NOTE ADULT - SUBJECTIVE AND OBJECTIVE BOX
Medicine Consult Note    JESSENIA LUCIA  79y  Male    INTERVAL HPI/OVERNIGHT EVENTS:      Review of Systems:  14 system ROS negative except as above      T(C): 36.4 (02-15-20 @ 06:00), Max: 37.1 (02-14-20 @ 17:39)  HR: 59 (02-15-20 @ 08:34) (59 - 95)  BP: 129/64 (02-15-20 @ 08:34) (124/62 - 143/70)  RR: 16 (02-15-20 @ 08:34) (16 - 18)  SpO2: 96% (02-15-20 @ 08:34) (91% - 98%)  Wt(kg): --Vital Signs Last 24 Hrs  T(C): 36.4 (15 Feb 2020 06:00), Max: 37.1 (14 Feb 2020 17:39)  T(F): 97.5 (15 Feb 2020 06:00), Max: 98.8 (14 Feb 2020 21:56)  HR: 59 (15 Feb 2020 08:34) (59 - 95)  BP: 129/64 (15 Feb 2020 08:34) (124/62 - 143/70)  BP(mean): 87 (15 Feb 2020 05:32) (84 - 99)  RR: 16 (15 Feb 2020 08:34) (16 - 18)  SpO2: 96% (15 Feb 2020 08:34) (91% - 98%)    Constitutional: WDWN M in NAD  Head: NC/AT  Eyes: EOMI, anicteric sclera  ENT: no nasal discharge  Neck: supple  Respiratory: CTA b/l, no increased work of breathing  Cardiac: regular rate, +S1/S2  Gastrointestinal: soft, NT/ND, +BS. Vertical midline scar  Extremities: WWP, no LE edema. RLE s/p BKA incision present and healed  Dermatologic: skin warm, dry  Neurologic: Alert and oriented, no focal deficits appreciated  Psychiatric: affect and characteristics of appearance, verbalizations, behaviors are appropriate    Consultant(s) Notes Reviewed:  [x ] YES  [ ] NO  Care Discussed with Consultants/Other Providers [ x] YES  [ ] NO    LABS:                        14.0   8.48  )-----------( 178      ( 14 Feb 2020 05:52 )             43.5     02-14    142  |  105  |  22  ----------------------------<  112<H>  4.9   |  24  |  0.97    Ca    8.6      14 Feb 2020 05:52  Phos  2.6     02-14  Mg     2.0     02-14          CAPILLARY BLOOD GLUCOSE      POCT Blood Glucose.: 140 mg/dL (15 Feb 2020 06:54)  POCT Blood Glucose.: 163 mg/dL (14 Feb 2020 21:45)  POCT Blood Glucose.: 133 mg/dL (14 Feb 2020 17:26)  POCT Blood Glucose.: 223 mg/dL (14 Feb 2020 11:40)            ALBUTerol    90 MICROgram(s) HFA Inhaler 2 Puff(s) Inhalation every 4 hours PRN  amLODIPine   Tablet 5 milliGRAM(s) Oral daily  aspirin enteric coated 81 milliGRAM(s) Oral daily  atorvastatin 80 milliGRAM(s) Oral at bedtime  buDESOnide    Inhalation Suspension 0.5 milliGRAM(s) Inhalation two times a day  chlorhexidine 0.12% Liquid 10 milliLiter(s) Swish and Spit once  dextrose 40% Gel 15 Gram(s) Oral once PRN  dextrose 5%. 1000 milliLiter(s) IV Continuous <Continuous>  dextrose 50% Injectable 12.5 Gram(s) IV Push once  dextrose 50% Injectable 25 Gram(s) IV Push once  dextrose 50% Injectable 25 Gram(s) IV Push once  famotidine    Tablet 20 milliGRAM(s) Oral daily  glucagon  Injectable 1 milliGRAM(s) IntraMuscular once PRN  heparin  Injectable 5000 Unit(s) SubCutaneous every 8 hours  insulin lispro (HumaLOG) corrective regimen sliding scale   SubCutaneous three times a day before meals  isosorbide   mononitrate ER Tablet (IMDUR) 60 milliGRAM(s) Oral daily  lidocaine   Patch 2 Patch Transdermal every 24 hours  metoprolol tartrate 25 milliGRAM(s) Oral two times a day  sodium chloride 0.9% lock flush 3 milliLiter(s) IV Push every 8 hours      RADIOLOGY & ADDITIONAL TESTS reviewed Medicine Consult Note    JESSENIA LUCIA  79y  Male    INTERVAL HPI/OVERNIGHT EVENTS: no acute events. Transferred to cardiology service. Denies nausea, emesis, chest pain, shortness of breath, abdominal pain      Review of Systems:  14 system ROS negative except as above      T(C): 36.4 (02-15-20 @ 06:00), Max: 37.1 (02-14-20 @ 17:39)  HR: 59 (02-15-20 @ 08:34) (59 - 95)  BP: 129/64 (02-15-20 @ 08:34) (124/62 - 143/70)  RR: 16 (02-15-20 @ 08:34) (16 - 18)  SpO2: 96% (02-15-20 @ 08:34) (91% - 98%)  Wt(kg): --Vital Signs Last 24 Hrs  T(C): 36.4 (15 Feb 2020 06:00), Max: 37.1 (14 Feb 2020 17:39)  T(F): 97.5 (15 Feb 2020 06:00), Max: 98.8 (14 Feb 2020 21:56)  HR: 59 (15 Feb 2020 08:34) (59 - 95)  BP: 129/64 (15 Feb 2020 08:34) (124/62 - 143/70)  BP(mean): 87 (15 Feb 2020 05:32) (84 - 99)  RR: 16 (15 Feb 2020 08:34) (16 - 18)  SpO2: 96% (15 Feb 2020 08:34) (91% - 98%)    Constitutional: WDWN M in NAD  Head: NC/AT  Eyes: EOMI, anicteric sclera  ENT: no nasal discharge  Neck: supple  Respiratory: CTA b/l, no increased work of breathing  Cardiac: regular rate, +S1/S2  Gastrointestinal: soft, NT/ND, +BS. Vertical midline scar  Extremities: WWP, no LE edema. RLE s/p BKA incision present and healed  Dermatologic: skin warm, dry  Neurologic: Alert and oriented, no focal deficits appreciated  Psychiatric: affect and characteristics of appearance, verbalizations, behaviors are appropriate    Consultant(s) Notes Reviewed:  [x ] YES  [ ] NO  Care Discussed with Consultants/Other Providers [ x] YES  [ ] NO    LABS:                        14.0   8.48  )-----------( 178      ( 14 Feb 2020 05:52 )             43.5     02-14    142  |  105  |  22  ----------------------------<  112<H>  4.9   |  24  |  0.97    Ca    8.6      14 Feb 2020 05:52  Phos  2.6     02-14  Mg     2.0     02-14          CAPILLARY BLOOD GLUCOSE      POCT Blood Glucose.: 140 mg/dL (15 Feb 2020 06:54)  POCT Blood Glucose.: 163 mg/dL (14 Feb 2020 21:45)  POCT Blood Glucose.: 133 mg/dL (14 Feb 2020 17:26)  POCT Blood Glucose.: 223 mg/dL (14 Feb 2020 11:40)            ALBUTerol    90 MICROgram(s) HFA Inhaler 2 Puff(s) Inhalation every 4 hours PRN  amLODIPine   Tablet 5 milliGRAM(s) Oral daily  aspirin enteric coated 81 milliGRAM(s) Oral daily  atorvastatin 80 milliGRAM(s) Oral at bedtime  buDESOnide    Inhalation Suspension 0.5 milliGRAM(s) Inhalation two times a day  chlorhexidine 0.12% Liquid 10 milliLiter(s) Swish and Spit once  dextrose 40% Gel 15 Gram(s) Oral once PRN  dextrose 5%. 1000 milliLiter(s) IV Continuous <Continuous>  dextrose 50% Injectable 12.5 Gram(s) IV Push once  dextrose 50% Injectable 25 Gram(s) IV Push once  dextrose 50% Injectable 25 Gram(s) IV Push once  famotidine    Tablet 20 milliGRAM(s) Oral daily  glucagon  Injectable 1 milliGRAM(s) IntraMuscular once PRN  heparin  Injectable 5000 Unit(s) SubCutaneous every 8 hours  insulin lispro (HumaLOG) corrective regimen sliding scale   SubCutaneous three times a day before meals  isosorbide   mononitrate ER Tablet (IMDUR) 60 milliGRAM(s) Oral daily  lidocaine   Patch 2 Patch Transdermal every 24 hours  metoprolol tartrate 25 milliGRAM(s) Oral two times a day  sodium chloride 0.9% lock flush 3 milliLiter(s) IV Push every 8 hours      RADIOLOGY & ADDITIONAL TESTS reviewed

## 2020-02-15 NOTE — PROGRESS NOTE ADULT - PROBLEM SELECTOR PLAN 1
- MRI brain and PET CT completed 02/13/2020  - MRI: No evidence brain mets but not conclusive 2/2 patient motion  - PET CT: FDG avid right hilar mass, FGD avid right lower paratracheal and prevascular LN, bilateral hilar LN, palatine tonsil  - Plan for Interventional Pulmonology biopsy on Tuesday 02/18/2020  - Continue to hold Plavix prior to biopsy - MRI brain and PET CT completed 02/13/2020  - MRI: No evidence brain mets but not conclusive 2/2 patient motion  - PET CT: FDG avid right hilar mass, FGD avid right lower paratracheal and prevascular LN, bilateral hilar LN, palatine tonsil  - Plan for Pulmonology to perform bronchoscopy and biopsy on Tuesday 02/18/2020.   - Continue to hold Plavix prior to biopsy (5-7 days prior to biopsy). Pt was transferred to [facility] on 02/12/2020 and did not receive Plavix at [facility], unclear if he received it as [facility] prior to transfer

## 2020-02-15 NOTE — PROGRESS NOTE ADULT - ATTENDING COMMENTS
79M w/ HFpEF with NSTEMI, planned for MIDCAB delayed given pulmonary mass pending biopsy with interventional pulmonology. Medicine consulted for recommendations regarding COPD and IDDM    # COPD - asymptomatic, c/w pulmicort/proventil    # DM - controlled c/w ISS, consistent carb diet    # CAD - intervention planned, mgmt per cardiology team    # CHF - mgmt per cardiology team

## 2020-02-15 NOTE — PROGRESS NOTE ADULT - ATTENDING COMMENTS
I have personally seen, examined, and participated in the care of this patient. I have reviewed all pertinent clinical information, including history, physical exam, plan and the PA's note and agree with the above.    Pt is 80 y/o male, current smoker (1-2 PPD X67 years), with PMHx of COPD, PVD s/p right BKA, s/p fem/popliteal bilateral bypass, chronic diastolic CHF, CAD s/p PCI 2005, DMII (insulin pump) transferred from [place] for midcab for 70% calcified mLAD lesion noted on cath.  Midcab deferred by CTS pending biopsy of RML mass. Pt originally admitted with CAP noted to have RML mass on CT. Unclear if pt had true NSTEMI at [place] but had positive stress test prompting cardiac cath. Pt currently chest pain free, euvolemic on exam.   Unclear if pt had true NSTEMI at [place]. Trop I at H .05 which can be residually elevated post Cath. FU repeat trop. EKG without evidence for prior MI or current ischemic changes. EF 47 without regional wall motion abnormalities  Cont ASA/statin/ metoprolol (pt with Mobitz 1 AV block likely due to high vagal tone).   Pt considered moderate risk for low risk procedure with Jane  risk 0.6% for cardiac events. Given high suspicion for lung Ca, pt benefits from undergoing bronch with biopsy and would proceed on current meds.

## 2020-02-16 DIAGNOSIS — I44.1 ATRIOVENTRICULAR BLOCK, SECOND DEGREE: ICD-10-CM

## 2020-02-16 DIAGNOSIS — E11.9 TYPE 2 DIABETES MELLITUS WITHOUT COMPLICATIONS: ICD-10-CM

## 2020-02-16 DIAGNOSIS — I25.10 ATHEROSCLEROTIC HEART DISEASE OF NATIVE CORONARY ARTERY WITHOUT ANGINA PECTORIS: ICD-10-CM

## 2020-02-16 LAB
ALBUMIN SERPL ELPH-MCNC: 3.1 G/DL — LOW (ref 3.3–5)
ALP SERPL-CCNC: 82 U/L — SIGNIFICANT CHANGE UP (ref 40–120)
ALT FLD-CCNC: 52 U/L — HIGH (ref 10–45)
ANION GAP SERPL CALC-SCNC: 14 MMOL/L — SIGNIFICANT CHANGE UP (ref 5–17)
AST SERPL-CCNC: 39 U/L — SIGNIFICANT CHANGE UP (ref 10–40)
BILIRUB SERPL-MCNC: 0.4 MG/DL — SIGNIFICANT CHANGE UP (ref 0.2–1.2)
BUN SERPL-MCNC: 20 MG/DL — SIGNIFICANT CHANGE UP (ref 7–23)
CALCIUM SERPL-MCNC: 9 MG/DL — SIGNIFICANT CHANGE UP (ref 8.4–10.5)
CHLORIDE SERPL-SCNC: 106 MMOL/L — SIGNIFICANT CHANGE UP (ref 96–108)
CK MB CFR SERPL CALC: 4.5 NG/ML — SIGNIFICANT CHANGE UP (ref 0–6.7)
CK SERPL-CCNC: 198 U/L — SIGNIFICANT CHANGE UP (ref 30–200)
CO2 SERPL-SCNC: 23 MMOL/L — SIGNIFICANT CHANGE UP (ref 22–31)
CREAT SERPL-MCNC: 1.08 MG/DL — SIGNIFICANT CHANGE UP (ref 0.5–1.3)
GLUCOSE BLDC GLUCOMTR-MCNC: 129 MG/DL — HIGH (ref 70–99)
GLUCOSE BLDC GLUCOMTR-MCNC: 137 MG/DL — HIGH (ref 70–99)
GLUCOSE BLDC GLUCOMTR-MCNC: 138 MG/DL — HIGH (ref 70–99)
GLUCOSE BLDC GLUCOMTR-MCNC: 169 MG/DL — HIGH (ref 70–99)
GLUCOSE SERPL-MCNC: 136 MG/DL — HIGH (ref 70–99)
HCT VFR BLD CALC: 40.8 % — SIGNIFICANT CHANGE UP (ref 39–50)
HGB BLD-MCNC: 13.1 G/DL — SIGNIFICANT CHANGE UP (ref 13–17)
MAGNESIUM SERPL-MCNC: 1.9 MG/DL — SIGNIFICANT CHANGE UP (ref 1.6–2.6)
MCHC RBC-ENTMCNC: 31.7 PG — SIGNIFICANT CHANGE UP (ref 27–34)
MCHC RBC-ENTMCNC: 32.1 GM/DL — SIGNIFICANT CHANGE UP (ref 32–36)
MCV RBC AUTO: 98.8 FL — SIGNIFICANT CHANGE UP (ref 80–100)
NRBC # BLD: 0 /100 WBCS — SIGNIFICANT CHANGE UP (ref 0–0)
PLATELET # BLD AUTO: 144 K/UL — LOW (ref 150–400)
POTASSIUM SERPL-MCNC: 4 MMOL/L — SIGNIFICANT CHANGE UP (ref 3.5–5.3)
POTASSIUM SERPL-SCNC: 4 MMOL/L — SIGNIFICANT CHANGE UP (ref 3.5–5.3)
PROT SERPL-MCNC: 6.8 G/DL — SIGNIFICANT CHANGE UP (ref 6–8.3)
RBC # BLD: 4.13 M/UL — LOW (ref 4.2–5.8)
RBC # FLD: 13.2 % — SIGNIFICANT CHANGE UP (ref 10.3–14.5)
SODIUM SERPL-SCNC: 143 MMOL/L — SIGNIFICANT CHANGE UP (ref 135–145)
TROPONIN T SERPL-MCNC: 0.02 NG/ML — HIGH (ref 0–0.01)
WBC # BLD: 6.8 K/UL — SIGNIFICANT CHANGE UP (ref 3.8–10.5)
WBC # FLD AUTO: 6.8 K/UL — SIGNIFICANT CHANGE UP (ref 3.8–10.5)

## 2020-02-16 PROCEDURE — 99233 SBSQ HOSP IP/OBS HIGH 50: CPT

## 2020-02-16 PROCEDURE — 99232 SBSQ HOSP IP/OBS MODERATE 35: CPT

## 2020-02-16 RX ORDER — LISINOPRIL 2.5 MG/1
2.5 TABLET ORAL DAILY
Refills: 0 | Status: DISCONTINUED | OUTPATIENT
Start: 2020-02-16 | End: 2020-02-17

## 2020-02-16 RX ADMIN — LISINOPRIL 2.5 MILLIGRAM(S): 2.5 TABLET ORAL at 11:08

## 2020-02-16 RX ADMIN — LIDOCAINE 2 PATCH: 4 CREAM TOPICAL at 19:09

## 2020-02-16 RX ADMIN — ATORVASTATIN CALCIUM 80 MILLIGRAM(S): 80 TABLET, FILM COATED ORAL at 22:29

## 2020-02-16 RX ADMIN — Medication 81 MILLIGRAM(S): at 10:41

## 2020-02-16 RX ADMIN — FAMOTIDINE 20 MILLIGRAM(S): 10 INJECTION INTRAVENOUS at 10:41

## 2020-02-16 RX ADMIN — SODIUM CHLORIDE 3 MILLILITER(S): 9 INJECTION INTRAMUSCULAR; INTRAVENOUS; SUBCUTANEOUS at 13:05

## 2020-02-16 RX ADMIN — Medication 2: at 12:25

## 2020-02-16 RX ADMIN — Medication 25 MILLIGRAM(S): at 17:17

## 2020-02-16 RX ADMIN — HEPARIN SODIUM 5000 UNIT(S): 5000 INJECTION INTRAVENOUS; SUBCUTANEOUS at 13:03

## 2020-02-16 RX ADMIN — LIDOCAINE 2 PATCH: 4 CREAM TOPICAL at 17:16

## 2020-02-16 RX ADMIN — SODIUM CHLORIDE 3 MILLILITER(S): 9 INJECTION INTRAMUSCULAR; INTRAVENOUS; SUBCUTANEOUS at 22:31

## 2020-02-16 RX ADMIN — HEPARIN SODIUM 5000 UNIT(S): 5000 INJECTION INTRAVENOUS; SUBCUTANEOUS at 22:30

## 2020-02-16 RX ADMIN — Medication 0.5 MILLIGRAM(S): at 22:29

## 2020-02-16 RX ADMIN — HEPARIN SODIUM 5000 UNIT(S): 5000 INJECTION INTRAVENOUS; SUBCUTANEOUS at 06:16

## 2020-02-16 RX ADMIN — Medication 0.5 MILLIGRAM(S): at 10:40

## 2020-02-16 RX ADMIN — LIDOCAINE 2 PATCH: 4 CREAM TOPICAL at 05:00

## 2020-02-16 RX ADMIN — SODIUM CHLORIDE 3 MILLILITER(S): 9 INJECTION INTRAMUSCULAR; INTRAVENOUS; SUBCUTANEOUS at 08:52

## 2020-02-16 RX ADMIN — AMLODIPINE BESYLATE 5 MILLIGRAM(S): 2.5 TABLET ORAL at 06:16

## 2020-02-16 RX ADMIN — Medication 25 MILLIGRAM(S): at 06:16

## 2020-02-16 RX ADMIN — ISOSORBIDE MONONITRATE 60 MILLIGRAM(S): 60 TABLET, EXTENDED RELEASE ORAL at 10:41

## 2020-02-16 NOTE — PROGRESS NOTE ADULT - PROBLEM SELECTOR PLAN 2
- Not currently surgical candidate for cardiac intervention  - 5 second pause overnight 02/13/2020, EP consulted and stated pt has high vagal tone and it was a Mobitz type 1. Pt can continue taking Metoprolol.   - Hold Plavix prior to biopsy, continue with Aspirin 81mg daily  - Continue Atorvastatin 80 mg daily -Currently chest pain-free  -Unclear if pt had true NSTEMI at [place]. Trop T at [facility] 0.05 to 0.02- may be residually elevated post cath  -EKG without evidence for prior MI or current ischemic changes  - Echo 2/13- Mildly reduced left ventricular systolic function. LVEF 47%, no regional WMA. Grade I diastolic dysfunction, mild MR.  -Transferred from [place] for MIDCAB for 70% calcified mLAD lesion noted on cath, however MIDCAB deferred by CT surgery pending biopsy of RML mass  -Cont aspirin 81mg, atorvastatin 80mg, metoprolol tart 25mg BID, and added Lisinopril 2.5mg daily  -holding Plavix prior to bronch/biopsy

## 2020-02-16 NOTE — PROGRESS NOTE ADULT - PROBLEM SELECTOR PLAN 5
- Telemetry with a 5-sec pause during which there was AVB but the P-P intervals progressively lengthened. Also, the VT interval lengthens out before the pause. This is highly suggestive of high vagal tone during rest. LVEF 47% on echo. No h/o syncope.   - Continue tele. Ok to continue Metoprolol 25 mg bid. No indication for PPM.    VTE PPX: Hep SQ  Dispo: pending lung biopsy on Tuesday 02/18/2020 -Continue standing budesonide and PRN albuterol

## 2020-02-16 NOTE — PROGRESS NOTE ADULT - PROBLEM SELECTOR PLAN 3
- BPs 120s-150s/50s-70s.   - Continue Amlodipine 5mg daily, Metoprolol Tartrate 25mg BID, Imdur 60mg daily. - Telemetry with a 5-sec pause on 2/13 for which EP consulted- consistent with mobitz type 1  -per EP, continue metoprolol tart 25mg BID  -no indication for PPM

## 2020-02-16 NOTE — PROGRESS NOTE ADULT - PROBLEM SELECTOR PLAN 4
- Continue albuterol and budesonide - Continue amlodipine 5mg daily, Metoprolol Tartrate 25mg BID, Imdur 60mg daily  -added Lisinopril 2.5mg daily

## 2020-02-16 NOTE — PROGRESS NOTE ADULT - SUBJECTIVE AND OBJECTIVE BOX
O/N Events:  Subjective/ROS: Denies HA, CP, SOB, n/v, changes in bowel/urinary habits.  12pt ROS otherwise negative.    VITALS  Vital Signs Last 24 Hrs  T(C): 36.4 (16 Feb 2020 10:35), Max: 36.8 (15 Feb 2020 19:04)  T(F): 97.6 (16 Feb 2020 10:35), Max: 98.2 (15 Feb 2020 19:04)  HR: 65 (16 Feb 2020 10:43) (62 - 72)  BP: 150/67 (16 Feb 2020 10:43) (117/56 - 165/69)  BP(mean): --  RR: 17 (16 Feb 2020 10:43) (16 - 18)  SpO2: 93% (16 Feb 2020 10:43) (92% - 98%)    CAPILLARY BLOOD GLUCOSE      POCT Blood Glucose.: 169 mg/dL (16 Feb 2020 12:05)  POCT Blood Glucose.: 129 mg/dL (16 Feb 2020 07:04)  POCT Blood Glucose.: 112 mg/dL (15 Feb 2020 21:48)  POCT Blood Glucose.: 172 mg/dL (15 Feb 2020 17:23)  POCT Blood Glucose.: 236 mg/dL (15 Feb 2020 13:36)      PHYSICAL EXAM  General: A&Ox3; NAD  Head: NC/AT; MMM; PERRL; EOMI;  Neck: Supple; no JVD  Respiratory: CTA B/L; no wheezes/crackles   Cardiovascular: Regular rhythm/rate; S1/S2   Gastrointestinal: Soft; NTND; normoactive BS  Extremities: WWP; no edema/cyanosis; R BKA  Neurological:  CNII-XII grossly intact; no obvious focal deficits    MEDICATIONS  (STANDING):  amLODIPine   Tablet 5 milliGRAM(s) Oral daily  aspirin enteric coated 81 milliGRAM(s) Oral daily  atorvastatin 80 milliGRAM(s) Oral at bedtime  buDESOnide    Inhalation Suspension 0.5 milliGRAM(s) Inhalation two times a day  chlorhexidine 0.12% Liquid 10 milliLiter(s) Swish and Spit once  dextrose 5%. 1000 milliLiter(s) (50 mL/Hr) IV Continuous <Continuous>  dextrose 50% Injectable 12.5 Gram(s) IV Push once  dextrose 50% Injectable 25 Gram(s) IV Push once  dextrose 50% Injectable 25 Gram(s) IV Push once  famotidine    Tablet 20 milliGRAM(s) Oral daily  heparin  Injectable 5000 Unit(s) SubCutaneous every 8 hours  insulin lispro (HumaLOG) corrective regimen sliding scale   SubCutaneous three times a day before meals  isosorbide   mononitrate ER Tablet (IMDUR) 60 milliGRAM(s) Oral daily  lidocaine   Patch 2 Patch Transdermal every 24 hours  lisinopril 2.5 milliGRAM(s) Oral daily  metoprolol tartrate 25 milliGRAM(s) Oral two times a day  sodium chloride 0.9% lock flush 3 milliLiter(s) IV Push every 8 hours    MEDICATIONS  (PRN):  ALBUTerol    90 MICROgram(s) HFA Inhaler 2 Puff(s) Inhalation every 4 hours PRN Shortness of Breath and/or Wheezing  dextrose 40% Gel 15 Gram(s) Oral once PRN Blood Glucose LESS THAN 70 milliGRAM(s)/deciliter  glucagon  Injectable 1 milliGRAM(s) IntraMuscular once PRN Glucose LESS THAN 70 milligrams/deciliter      No Known Allergies      LABS                        13.1   6.80  )-----------( 144      ( 16 Feb 2020 07:29 )             40.8     02-16    143  |  106  |  20  ----------------------------<  136<H>  4.0   |  23  |  1.08    Ca    9.0      16 Feb 2020 07:29  Mg     1.9     02-16    TPro  6.8  /  Alb  3.1<L>  /  TBili  0.4  /  DBili  x   /  AST  39  /  ALT  52<H>  /  AlkPhos  82  02-16        CARDIAC MARKERS ( 16 Feb 2020 07:29 )  x     / 0.02 ng/mL / 198 U/L / x     / 4.5 ng/mL        IMAGING/EKG/ETC  EKG:  Xray:  CT:  MRI:

## 2020-02-16 NOTE — PROGRESS NOTE ADULT - ATTENDING COMMENTS
sdfsdf I have personally seen, examined, and participated in the care of this patient. I have reviewed all pertinent clinical information, including history, physical exam, plan and the PA's note and agree with the above.    -Without active complaints - no CP/SOB  -Euvolemic on exam  -After extensive discussion with Dr Guerrero (pulmonary) pt for bronch with bx on tuesday  -Pre-op clearance in note 2/15  -Currently no active cardiac issues, cont current meds

## 2020-02-16 NOTE — PROGRESS NOTE ADULT - PROBLEM SELECTOR PLAN 1
- MRI brain and PET CT completed 02/13/2020  - MRI: No evidence brain mets but not conclusive 2/2 patient motion  - PET CT: FDG avid right hilar mass, FGD avid right lower paratracheal and prevascular LN, bilateral hilar LN, palatine tonsil  - Plan for Pulmonology to perform bronchoscopy and biopsy on Tuesday 02/18/2020.   - Continue to hold Plavix prior to biopsy (5-7 days prior to biopsy). Pt was transferred to [facility] on 02/12/2020 and did not receive Plavix at [facility], unclear if he received it as [facility] prior to transfer -CT chest at [place] revealed right-sided middle lobe lung mass suspicious for malignancy  -MRI brain-no evidence brain mets but not conclusive 2/2 patient motion  -PET-CT- FDG avid right hilar mass, FGD avid right lower paratracheal and prevascular LN, bilateral hilar LN, palatine tonsil  - Pulm consulted for bronchoscopy/biopsy on Tuesday 02/18/2020.   - Continue to hold Plavix prior to biopsy (ideally hold for 5-7 days). Pt was transferred to [facility] on 02/12/2020 and did not receive Plavix at [facility], unclear if he received it as [place] prior to transfer -CT chest at [place] revealed right-sided middle lobe lung mass suspicious for malignancy  -MRI brain-no evidence brain mets but not conclusive 2/2 patient motion  -PET-CT- FDG avid right hilar mass, FGD avid right lower paratracheal and prevascular LN, bilateral hilar LN, palatine tonsil  - Interventional pulm consulted for bronchoscopy/biopsy on Tuesday 02/18/2020.   - Continue to hold Plavix prior to biopsy

## 2020-02-16 NOTE — PROGRESS NOTE ADULT - ASSESSMENT
79M w/ HFpEF with NSTEMI, planned for MIDCAB delayed given pulmonary mass pending biopsy with interventional pulmonology. Medicine consulted for recommendations regarding COPD and IDDM.  Plan to be kept inpatient for biopsy on Tuesday w/ interventional pulm.    # COPD - asymptomatic, c/w pulmicort/proventil    # DM - controlled c/w ISS, consistent carb diet    # CAD - intervention planned, mgmt per cardiology team    # CHF - mgmt per cardiology team;    Plan discussed w/ [facility] PA team.  Medicine to sign off; please re-consult w/ questions.

## 2020-02-16 NOTE — PROGRESS NOTE ADULT - PROBLEM SELECTOR PLAN 6
-A1c 7.6%  -controlled, c/w ISS, consistent carb diet    DVT PPx- SQH    Dispo- pending bronch/biopsy on Tues 2/18    Case d/w Dr. Samaniego

## 2020-02-16 NOTE — PROGRESS NOTE ADULT - ASSESSMENT
78 y/o male, current smoker (1-2 PPD X67 years), with a PMHx of COPD, PVD s/p right BKA, s/p fem/popliteal bilateral bypass, chronic diastolic CHF, CAD s/p PCI 2005, DMII (insulin pump) who presented to [facility] with SOB and chest pain, was admitted for community acquired pneumonia, where he was found to have right sided middle lobe lung mass suspicious for malignancy. During the course of his work-up he was noted to have elevated troponin and diagnosed with NSTEMI, subsequently underwent abnormal stress test and cardiac catheterization demonstrated heavily calcified mid LAD stenosis (70%). The patient was transferred to [facility] under the care of Dr. King Saez for evaluation of possible MIDCAB. However, coronary intervention is now held pending further workup of lung mass. Interventional pulmonary team consulted for lung biopsy scheduled for Tuesday 2/18. While on [facility] pt had 5 second pause on telemetry, EP consulted and stated it was a Mobitz type 1 and pt had high vagal tone and may continue taking Metoprolol Tartrate. Pt was stepped down to [facility] on 02/14 for tele monitoring 78 y/o male, current smoker (1-2 PPD X67 years), with a PMHx of COPD, PVD s/p right BKA, s/p fem/popliteal bilateral bypass, chronic diastolic CHF, CAD s/p PCI 2005, DMII (insulin pump) who presented to [facility] with SOB and chest pain, was admitted for community acquired pneumonia, where he was found to have right sided middle lobe lung mass suspicious for malignancy. During the course of his work-up he was noted to have elevated troponin and diagnosed with NSTEMI, subsequently underwent abnormal stress test and cardiac catheterization demonstrated heavily calcified mid LAD stenosis (70%). The patient was transferred to [facility] under Dr. King Saez for evaluation of possible MIDCAB. However, coronary intervention is now held pending further workup of lung mass. Interventional pulmonary team consulted for lung biopsy scheduled for Tuesday 2/18. While on 9 [facility] pt had 5 second pause on telemetry, EP consulted and stated it was a Mobitz type 1 likely due to high vagal tone. Pt was stepped down to [facility] on 02/14 for tele monitoring, pending bronch/biopsy on 2/18. 78 y/o male, current smoker (1-2 PPD X67 years), with a PMHx of COPD, PVD s/p right BKA, s/p fem/popliteal bilateral bypass, chronic diastolic CHF, CAD s/p PCI 2005, DMII (insulin pump) who presented to [facility] with SOB and chest pain, was admitted for community acquired pneumonia, where he was found to have right sided middle lobe lung mass suspicious for malignancy. During the course of his work-up he was noted to have elevated troponin and diagnosed with NSTEMI, subsequently underwent abnormal stress test and cardiac catheterization demonstrated heavily calcified mid LAD stenosis (70%). The patient was transferred to [facility] under Dr. King Saez for evaluation of possible MIDCAB. However, coronary intervention is now held pending further workup of lung mass. Interventional pulmonary team consulted for lung biopsy scheduled for Tuesday 2/18. While on 9 [facility] pt had 5 second pause on telemetry, EP consulted, confirmed Mobitz type 1, likely due to high vagal tone. Pt was stepped down to [facility] on 02/14 for tele monitoring, pending bronch/biopsy on 2/18.

## 2020-02-16 NOTE — PROGRESS NOTE ADULT - SUBJECTIVE AND OBJECTIVE BOX
S:   Pt seen and examined bedside. Comfortable, denies C/P, SOB, dizziness, palpitations.     12 Point ROS otherwise negative except as per HPI/subjective.     O: Vital Signs Last 24 Hrs  T(C): 36.4 (2020 10:35), Max: 36.8 (15 Feb 2020 19:04)  T(F): 97.6 (2020 10:35), Max: 98.2 (15 Feb 2020 19:04)  HR: 65 (2020 10:43) (62 - 72)  BP: 150/67 (2020 10:43) (117/56 - 165/69)  RR: 17 (2020 10:43) (16 - 18)  SpO2: 93% (2020 10:43) (92% - 98%)    PHYSICAL EXAM:  GEN: NAD	  Neck: Supple, - JVD   Cardiovascular: Normal S1 S2, No murmurs  Respiratory: Lungs clear to auscultation, b/l, No Rales, Rhonchi, Wheezing	  Gastrointestinal:  Soft, Non-tender, + BS	  Skin: No rashes, No ecchymoses, No cyanosis  Extremities: right BKA. normal ROM left leg. Right femoral artery cardiac cath wound site stable  Vascular: Peripheral pulses palpable 2+ bilaterally  Neurologic: Non-focal  Psychiatry: A & O x 3, Mood & affect appropriate    LABS:                        13.1   6.80  )-----------( 144      ( 2020 07:29 )             40.8         143  |  106  |  20  ----------------------------<  136<H>  4.0   |  23  |  1.08    Ca    9.0      2020 07:29  Mg     1.9         TPro  6.8  /  Alb  3.1<L>  /  TBili  0.4  /  DBili  x   /  AST  39  /  ALT  52<H>  /  AlkPhos  82        Troponin T, Serum: 0.02 ng/mL (20 @ 07:29)  Troponin T, Serum: 0.05 ng/mL (20 @ 21:14)      02-15 @ 07:01  -   @ 07:00  --------------------------------------------------------  IN: 240 mL / OUT: 925 mL / NET: -685 mL     @ 07:01   @ 10:46  --------------------------------------------------------  IN: 240 mL / OUT: 0 mL / NET: 240 mL      Daily     Daily Weight in k.6 (2020 06:43)

## 2020-02-17 LAB
ANION GAP SERPL CALC-SCNC: 12 MMOL/L — SIGNIFICANT CHANGE UP (ref 5–17)
BUN SERPL-MCNC: 17 MG/DL — SIGNIFICANT CHANGE UP (ref 7–23)
CALCIUM SERPL-MCNC: 9.1 MG/DL — SIGNIFICANT CHANGE UP (ref 8.4–10.5)
CHLORIDE SERPL-SCNC: 107 MMOL/L — SIGNIFICANT CHANGE UP (ref 96–108)
CO2 SERPL-SCNC: 25 MMOL/L — SIGNIFICANT CHANGE UP (ref 22–31)
CREAT SERPL-MCNC: 1.2 MG/DL — SIGNIFICANT CHANGE UP (ref 0.5–1.3)
GLUCOSE BLDC GLUCOMTR-MCNC: 115 MG/DL — HIGH (ref 70–99)
GLUCOSE BLDC GLUCOMTR-MCNC: 123 MG/DL — HIGH (ref 70–99)
GLUCOSE BLDC GLUCOMTR-MCNC: 162 MG/DL — HIGH (ref 70–99)
GLUCOSE BLDC GLUCOMTR-MCNC: 168 MG/DL — HIGH (ref 70–99)
GLUCOSE SERPL-MCNC: 130 MG/DL — HIGH (ref 70–99)
HCT VFR BLD CALC: 39.5 % — SIGNIFICANT CHANGE UP (ref 39–50)
HGB BLD-MCNC: 13 G/DL — SIGNIFICANT CHANGE UP (ref 13–17)
MAGNESIUM SERPL-MCNC: 2 MG/DL — SIGNIFICANT CHANGE UP (ref 1.6–2.6)
MCHC RBC-ENTMCNC: 31.9 PG — SIGNIFICANT CHANGE UP (ref 27–34)
MCHC RBC-ENTMCNC: 32.9 GM/DL — SIGNIFICANT CHANGE UP (ref 32–36)
MCV RBC AUTO: 97.1 FL — SIGNIFICANT CHANGE UP (ref 80–100)
NRBC # BLD: 0 /100 WBCS — SIGNIFICANT CHANGE UP (ref 0–0)
PLATELET # BLD AUTO: 168 K/UL — SIGNIFICANT CHANGE UP (ref 150–400)
POTASSIUM SERPL-MCNC: 4.1 MMOL/L — SIGNIFICANT CHANGE UP (ref 3.5–5.3)
POTASSIUM SERPL-SCNC: 4.1 MMOL/L — SIGNIFICANT CHANGE UP (ref 3.5–5.3)
RBC # BLD: 4.07 M/UL — LOW (ref 4.2–5.8)
RBC # FLD: 12.9 % — SIGNIFICANT CHANGE UP (ref 10.3–14.5)
SODIUM SERPL-SCNC: 144 MMOL/L — SIGNIFICANT CHANGE UP (ref 135–145)
WBC # BLD: 7.64 K/UL — SIGNIFICANT CHANGE UP (ref 3.8–10.5)
WBC # FLD AUTO: 7.64 K/UL — SIGNIFICANT CHANGE UP (ref 3.8–10.5)

## 2020-02-17 PROCEDURE — 93010 ELECTROCARDIOGRAM REPORT: CPT

## 2020-02-17 RX ORDER — LISINOPRIL 2.5 MG/1
2.5 TABLET ORAL ONCE
Refills: 0 | Status: COMPLETED | OUTPATIENT
Start: 2020-02-17 | End: 2020-02-17

## 2020-02-17 RX ORDER — LISINOPRIL 2.5 MG/1
5 TABLET ORAL DAILY
Refills: 0 | Status: DISCONTINUED | OUTPATIENT
Start: 2020-02-18 | End: 2020-02-19

## 2020-02-17 RX ADMIN — LIDOCAINE 2 PATCH: 4 CREAM TOPICAL at 05:00

## 2020-02-17 RX ADMIN — HEPARIN SODIUM 5000 UNIT(S): 5000 INJECTION INTRAVENOUS; SUBCUTANEOUS at 06:52

## 2020-02-17 RX ADMIN — Medication 25 MILLIGRAM(S): at 17:49

## 2020-02-17 RX ADMIN — Medication 0.5 MILLIGRAM(S): at 10:51

## 2020-02-17 RX ADMIN — FAMOTIDINE 20 MILLIGRAM(S): 10 INJECTION INTRAVENOUS at 10:51

## 2020-02-17 RX ADMIN — LIDOCAINE 2 PATCH: 4 CREAM TOPICAL at 17:49

## 2020-02-17 RX ADMIN — AMLODIPINE BESYLATE 5 MILLIGRAM(S): 2.5 TABLET ORAL at 06:55

## 2020-02-17 RX ADMIN — Medication 2: at 16:41

## 2020-02-17 RX ADMIN — SODIUM CHLORIDE 3 MILLILITER(S): 9 INJECTION INTRAMUSCULAR; INTRAVENOUS; SUBCUTANEOUS at 14:03

## 2020-02-17 RX ADMIN — Medication 0.5 MILLIGRAM(S): at 22:05

## 2020-02-17 RX ADMIN — Medication 25 MILLIGRAM(S): at 07:47

## 2020-02-17 RX ADMIN — HEPARIN SODIUM 5000 UNIT(S): 5000 INJECTION INTRAVENOUS; SUBCUTANEOUS at 13:51

## 2020-02-17 RX ADMIN — ATORVASTATIN CALCIUM 80 MILLIGRAM(S): 80 TABLET, FILM COATED ORAL at 22:05

## 2020-02-17 RX ADMIN — Medication 2: at 12:23

## 2020-02-17 RX ADMIN — HEPARIN SODIUM 5000 UNIT(S): 5000 INJECTION INTRAVENOUS; SUBCUTANEOUS at 22:05

## 2020-02-17 RX ADMIN — LIDOCAINE 2 PATCH: 4 CREAM TOPICAL at 19:03

## 2020-02-17 RX ADMIN — ISOSORBIDE MONONITRATE 60 MILLIGRAM(S): 60 TABLET, EXTENDED RELEASE ORAL at 10:51

## 2020-02-17 RX ADMIN — SODIUM CHLORIDE 3 MILLILITER(S): 9 INJECTION INTRAMUSCULAR; INTRAVENOUS; SUBCUTANEOUS at 06:55

## 2020-02-17 RX ADMIN — LISINOPRIL 2.5 MILLIGRAM(S): 2.5 TABLET ORAL at 13:51

## 2020-02-17 RX ADMIN — Medication 81 MILLIGRAM(S): at 10:51

## 2020-02-17 RX ADMIN — SODIUM CHLORIDE 3 MILLILITER(S): 9 INJECTION INTRAMUSCULAR; INTRAVENOUS; SUBCUTANEOUS at 22:05

## 2020-02-17 RX ADMIN — LISINOPRIL 2.5 MILLIGRAM(S): 2.5 TABLET ORAL at 06:55

## 2020-02-17 NOTE — PROGRESS NOTE ADULT - PROBLEM SELECTOR PLAN 1
-CT chest at [place] revealed right-sided middle lobe lung mass suspicious for malignancy  -MRI brain-no evidence brain mets but not conclusive 2/2 patient motion  -PET-CT- FDG avid right hilar mass, FGD avid right lower paratracheal and prevascular LN, bilateral hilar LN, palatine tonsil  - Interventional pulm consulted for bronchoscopy/biopsy on Tuesday 02/18/2020.   - Continue to hold Plavix prior to biopsy  -NPO after midnight

## 2020-02-17 NOTE — PROGRESS NOTE ADULT - ASSESSMENT
78 y/o male, current smoker (1-2 PPD X67 years), with a PMHx of COPD, PVD s/p right BKA, s/p fem/popliteal bilateral bypass, chronic diastolic CHF, CAD s/p PCI 2005, DMII (insulin pump) who presented to [facility] with SOB and chest pain, was admitted for community acquired pneumonia, where he was found to have right sided middle lobe lung mass suspicious for malignancy. During the course of his work-up he was noted to have elevated troponin and diagnosed with NSTEMI, subsequently underwent abnormal stress test and cardiac catheterization demonstrated heavily calcified mid LAD stenosis (70%). The patient was transferred to [facility] under Dr. King Saez for evaluation of possible MIDCAB. However, coronary intervention is now held pending further workup of lung mass. Interventional pulmonary team consulted for lung biopsy scheduled for Tuesday 2/18. While on 9 Lachman pt had 5 second pause on telemetry, EP consulted, confirmed Mobitz type 1, likely due to high vagal tone. Pt was stepped down to 5 Uris on 02/14 for tele monitoring, pending bronch/biopsy on 2/18.

## 2020-02-17 NOTE — PROGRESS NOTE ADULT - SUBJECTIVE AND OBJECTIVE BOX
S:   Pt seen and examined bedside. Comfortable, denies chest pain, SOB, dizziness, or palpitations.     12 Point ROS otherwise negative except as per HPI/subjective.     O: Vital Signs Last 24 Hrs  T(C): 36.8 (2020 06:08), Max: 37.1 (2020 21:23)  T(F): 98.2 (2020 06:08), Max: 98.7 (2020 21:23)  HR: 68 (2020 05:57) (60 - 68)  BP: 155/70 (2020 05:57) (120/57 - 165/69)  RR: 16 (2020 05:57) (16 - 18)  SpO2: 99% (2020 05:57) (92% - 100%)    PHYSICAL EXAM:  GEN: NAD	  Neck: Supple, - JVD   Cardiovascular: Normal S1 S2, No murmurs  Respiratory: Lungs clear to auscultation, b/l, No Rales, Rhonchi, Wheezing	  Gastrointestinal:  Soft, Non-tender, + BS	  Skin: WWP  Extremities: right BKA. normal ROM left leg. Right femoral artery cardiac cath wound site stable  Vascular: Peripheral pulses palpable 2+ bilaterally  Neurologic: Non-focal  Psychiatry: A & O x 3, Mood & affect appropriate      LABS:                        13.0   7.64  )-----------( 168      ( 2020 06:13 )             39.5         144  |  107  |  17  ----------------------------<  130<H>  4.1   |  25  |  1.20    Ca    9.1      2020 06:13  Mg     2.0         TPro  6.8  /  Alb  3.1<L>  /  TBili  0.4  /  DBili  x   /  AST  39  /  ALT  52<H>  /  AlkPhos  82        Troponin T, Serum: 0.02 ng/mL (20 @ 07:29)  Troponin T, Serum: 0.05 ng/mL (20 @ 21:14)       @ 07:01  -   @ 07:00  --------------------------------------------------------  IN: 300 mL / OUT: 580 mL / NET: -280 mL      Daily     Daily Weight in k.5 (2020 06:08)

## 2020-02-17 NOTE — PROGRESS NOTE ADULT - PROBLEM SELECTOR PLAN 3
- Telemetry with a 5-sec pause on 2/13 for which EP consulted- consistent with mobitz type 1  -per EP, continue metoprolol tart 25mg BID  -no indication for PPM

## 2020-02-17 NOTE — PROGRESS NOTE ADULT - PROBLEM SELECTOR PLAN 5
-Continue standing budesonide and PRN albuterol -Respiratory status stable, requiring 1-2L O2. No wheeze  -Continue standing budesonide and PRN albuterol

## 2020-02-17 NOTE — PROGRESS NOTE ADULT - PROBLEM SELECTOR PLAN 2
-Currently chest pain-free  -Unclear if pt had true NSTEMI at [place]. Trop T at [facility] 0.05 to 0.02- may be residually elevated post cath  -EKG without evidence for prior MI or current ischemic changes  - Echo 2/13- Mildly reduced left ventricular systolic function. LVEF 47%, no regional WMA. Grade I diastolic dysfunction, mild MR.  -Transferred from [place] for MIDCAB for 70% calcified mLAD lesion noted on cath, however MIDCAB deferred by CT surgery pending biopsy of RML mass  -Cont aspirin 81mg, atorvastatin 80mg, metoprolol tart 25mg BID, and added Lisinopril 2.5mg daily  -holding Plavix prior to bronch/biopsy

## 2020-02-17 NOTE — PROGRESS NOTE ADULT - PROBLEM SELECTOR PLAN 4
- Continue amlodipine 5mg daily, Metoprolol Tartrate 25mg BID, Imdur 60mg daily  -added Lisinopril 2.5mg daily - Continue amlodipine 5mg daily, Metoprolol Tartrate 25mg BID, Imdur 60mg daily, Lisinopril 2.5mg daily

## 2020-02-17 NOTE — PROGRESS NOTE ADULT - PROBLEM SELECTOR PLAN 6
-A1c 7.6%  -controlled, c/w ISS, consistent carb diet    DVT PPx- [facility]    Dispo- pending bronch/biopsy on Tues 2/18    Case d/w Dr. Samaniego -A1c 7.6%  -controlled, c/w ISS, consistent carb diet    DVT PPx- [facility]    Dispo- pending bronch/biopsy on Tues 2/18    Case d/w Dr. Salmeron

## 2020-02-18 ENCOUNTER — RESULT REVIEW (OUTPATIENT)
Age: 80
End: 2020-02-18

## 2020-02-18 DIAGNOSIS — E83.42 HYPOMAGNESEMIA: ICD-10-CM

## 2020-02-18 LAB
ANION GAP SERPL CALC-SCNC: 12 MMOL/L — SIGNIFICANT CHANGE UP (ref 5–17)
APTT BLD: 43.8 SEC — HIGH (ref 27.5–36.3)
BUN SERPL-MCNC: 20 MG/DL — SIGNIFICANT CHANGE UP (ref 7–23)
CALCIUM SERPL-MCNC: 9 MG/DL — SIGNIFICANT CHANGE UP (ref 8.4–10.5)
CHLORIDE SERPL-SCNC: 108 MMOL/L — SIGNIFICANT CHANGE UP (ref 96–108)
CO2 SERPL-SCNC: 22 MMOL/L — SIGNIFICANT CHANGE UP (ref 22–31)
CREAT SERPL-MCNC: 1.13 MG/DL — SIGNIFICANT CHANGE UP (ref 0.5–1.3)
GLUCOSE BLDC GLUCOMTR-MCNC: 114 MG/DL — HIGH (ref 70–99)
GLUCOSE BLDC GLUCOMTR-MCNC: 138 MG/DL — HIGH (ref 70–99)
GLUCOSE BLDC GLUCOMTR-MCNC: 144 MG/DL — HIGH (ref 70–99)
GLUCOSE BLDC GLUCOMTR-MCNC: 205 MG/DL — HIGH (ref 70–99)
GLUCOSE SERPL-MCNC: 125 MG/DL — HIGH (ref 70–99)
GRAM STN FLD: SIGNIFICANT CHANGE UP
HCT VFR BLD CALC: 39.4 % — SIGNIFICANT CHANGE UP (ref 39–50)
HGB BLD-MCNC: 12.9 G/DL — LOW (ref 13–17)
INR BLD: 1.03 — SIGNIFICANT CHANGE UP (ref 0.88–1.16)
MAGNESIUM SERPL-MCNC: 1.9 MG/DL — SIGNIFICANT CHANGE UP (ref 1.6–2.6)
MCHC RBC-ENTMCNC: 32.2 PG — SIGNIFICANT CHANGE UP (ref 27–34)
MCHC RBC-ENTMCNC: 32.7 GM/DL — SIGNIFICANT CHANGE UP (ref 32–36)
MCV RBC AUTO: 98.3 FL — SIGNIFICANT CHANGE UP (ref 80–100)
NRBC # BLD: 0 /100 WBCS — SIGNIFICANT CHANGE UP (ref 0–0)
PLATELET # BLD AUTO: 161 K/UL — SIGNIFICANT CHANGE UP (ref 150–400)
POTASSIUM SERPL-MCNC: 4 MMOL/L — SIGNIFICANT CHANGE UP (ref 3.5–5.3)
POTASSIUM SERPL-SCNC: 4 MMOL/L — SIGNIFICANT CHANGE UP (ref 3.5–5.3)
PROTHROM AB SERPL-ACNC: 11.8 SEC — SIGNIFICANT CHANGE UP (ref 10–12.9)
RBC # BLD: 4.01 M/UL — LOW (ref 4.2–5.8)
RBC # FLD: 13.1 % — SIGNIFICANT CHANGE UP (ref 10.3–14.5)
SODIUM SERPL-SCNC: 142 MMOL/L — SIGNIFICANT CHANGE UP (ref 135–145)
SPECIMEN SOURCE: SIGNIFICANT CHANGE UP
WBC # BLD: 7.63 K/UL — SIGNIFICANT CHANGE UP (ref 3.8–10.5)
WBC # FLD AUTO: 7.63 K/UL — SIGNIFICANT CHANGE UP (ref 3.8–10.5)

## 2020-02-18 PROCEDURE — 99232 SBSQ HOSP IP/OBS MODERATE 35: CPT

## 2020-02-18 PROCEDURE — 88342 IMHCHEM/IMCYTCHM 1ST ANTB: CPT | Mod: 26,59

## 2020-02-18 PROCEDURE — 31652 BRONCH EBUS SAMPLNG 1/2 NODE: CPT | Mod: GC

## 2020-02-18 PROCEDURE — 88305 TISSUE EXAM BY PATHOLOGIST: CPT | Mod: 26

## 2020-02-18 PROCEDURE — 31625 BRONCHOSCOPY W/BIOPSY(S): CPT | Mod: GC

## 2020-02-18 PROCEDURE — 31624 DX BRONCHOSCOPE/LAVAGE: CPT | Mod: GC

## 2020-02-18 PROCEDURE — 88341 IMHCHEM/IMCYTCHM EA ADD ANTB: CPT | Mod: 26,59

## 2020-02-18 PROCEDURE — 88173 CYTOPATH EVAL FNA REPORT: CPT | Mod: 26,59

## 2020-02-18 PROCEDURE — 88344 IMHCHEM/IMCYTCHM EA MLT ANTB: CPT | Mod: 26

## 2020-02-18 PROCEDURE — 88305 TISSUE EXAM BY PATHOLOGIST: CPT | Mod: 26,59

## 2020-02-18 PROCEDURE — 88112 CYTOPATH CELL ENHANCE TECH: CPT | Mod: 26

## 2020-02-18 RX ORDER — AMLODIPINE BESYLATE 2.5 MG/1
10 TABLET ORAL DAILY
Refills: 0 | Status: DISCONTINUED | OUTPATIENT
Start: 2020-02-18 | End: 2020-02-19

## 2020-02-18 RX ORDER — MAGNESIUM OXIDE 400 MG ORAL TABLET 241.3 MG
400 TABLET ORAL ONCE
Refills: 0 | Status: COMPLETED | OUTPATIENT
Start: 2020-02-18 | End: 2020-02-18

## 2020-02-18 RX ADMIN — AMLODIPINE BESYLATE 5 MILLIGRAM(S): 2.5 TABLET ORAL at 06:05

## 2020-02-18 RX ADMIN — LIDOCAINE 2 PATCH: 4 CREAM TOPICAL at 19:24

## 2020-02-18 RX ADMIN — Medication 81 MILLIGRAM(S): at 11:18

## 2020-02-18 RX ADMIN — Medication 0.5 MILLIGRAM(S): at 21:43

## 2020-02-18 RX ADMIN — MAGNESIUM OXIDE 400 MG ORAL TABLET 400 MILLIGRAM(S): 241.3 TABLET ORAL at 10:18

## 2020-02-18 RX ADMIN — Medication 25 MILLIGRAM(S): at 06:05

## 2020-02-18 RX ADMIN — SODIUM CHLORIDE 3 MILLILITER(S): 9 INJECTION INTRAMUSCULAR; INTRAVENOUS; SUBCUTANEOUS at 21:42

## 2020-02-18 RX ADMIN — HEPARIN SODIUM 5000 UNIT(S): 5000 INJECTION INTRAVENOUS; SUBCUTANEOUS at 21:43

## 2020-02-18 RX ADMIN — LIDOCAINE 2 PATCH: 4 CREAM TOPICAL at 06:44

## 2020-02-18 RX ADMIN — FAMOTIDINE 20 MILLIGRAM(S): 10 INJECTION INTRAVENOUS at 11:18

## 2020-02-18 RX ADMIN — ISOSORBIDE MONONITRATE 60 MILLIGRAM(S): 60 TABLET, EXTENDED RELEASE ORAL at 11:17

## 2020-02-18 RX ADMIN — Medication 25 MILLIGRAM(S): at 19:22

## 2020-02-18 RX ADMIN — ATORVASTATIN CALCIUM 80 MILLIGRAM(S): 80 TABLET, FILM COATED ORAL at 21:43

## 2020-02-18 RX ADMIN — Medication 4: at 21:53

## 2020-02-18 RX ADMIN — HEPARIN SODIUM 5000 UNIT(S): 5000 INJECTION INTRAVENOUS; SUBCUTANEOUS at 06:05

## 2020-02-18 RX ADMIN — LISINOPRIL 5 MILLIGRAM(S): 2.5 TABLET ORAL at 06:05

## 2020-02-18 RX ADMIN — SODIUM CHLORIDE 3 MILLILITER(S): 9 INJECTION INTRAMUSCULAR; INTRAVENOUS; SUBCUTANEOUS at 19:01

## 2020-02-18 RX ADMIN — SODIUM CHLORIDE 3 MILLILITER(S): 9 INJECTION INTRAMUSCULAR; INTRAVENOUS; SUBCUTANEOUS at 06:06

## 2020-02-18 RX ADMIN — Medication 0.5 MILLIGRAM(S): at 10:18

## 2020-02-18 NOTE — PROGRESS NOTE ADULT - I WAS PHYSICALLY PRESENT FOR THE KEY PORTIONS OF THE EVALUATION AND MANAGEMENT (E/M) SERVICE PROVIDED.  I AGREE WITH THE ABOVE HISTORY, PHYSICAL, AND PLAN WHICH I HAVE REVIEWED AND EDITED WHERE APPROPRIATE
Called pt and verified name and . Pt voiced that she has a slight HA. Pt denied any rashes, sob, chest pain, intense HA. Voiced to pt that it could be lack of water because pt reports that she is not drinking any water. Advised to pt to increase hydration, and take OTC medication to help with HA, and contact us if any of her symptoms get worse. She voiced understanding. No further questions at this time.
Statement Selected

## 2020-02-18 NOTE — PROGRESS NOTE ADULT - PROBLEM SELECTOR PLAN 4
- Continue amlodipine 5mg daily, Metoprolol Tartrate 25mg BID, Imdur 60mg daily, Lisinopril 2.5mg daily - 's-160's  - Continue Metoprolol Tartrate 25mg BID, Imdur 60mg daily, Lisinopril 2.5mg daily  - Amlodipine was increased from 5mg to 10mg daily per Dr. Samaniego

## 2020-02-18 NOTE — PROGRESS NOTE ADULT - PROBLEM SELECTOR PLAN 6
-A1c 7.6%  -controlled, c/w ISS, consistent carb diet    DVT PPx- [facility]    Dispo- pending bronch/biopsy on Tues 2/18    Case d/w Dr. Salmeron -A1c 7.6%  -controlled, c/w ISS, consistent carb diet    DVT PPx- [facility]  Dispo- pending bronch/biopsy results and CTS recommendations, Pt consulted f/u recs     Case d/w Dr. Samaniego -A1c 7.6%  -controlled, c/w ISS, consistent carb diet

## 2020-02-18 NOTE — PROGRESS NOTE ADULT - PROBLEM SELECTOR PLAN 2
-Currently chest pain-free  -Unclear if pt had true NSTEMI at [place]. Trop T at [facility] 0.05 to 0.02- may be residually elevated post cath  -EKG without evidence for prior MI or current ischemic changes  - Echo 2/13- Mildly reduced left ventricular systolic function. LVEF 47%, no regional WMA. Grade I diastolic dysfunction, mild MR.  -Transferred from [place] for MIDCAB for 70% calcified mLAD lesion noted on cath, however MIDCAB deferred by CT surgery pending biopsy of RML mass  -Cont aspirin 81mg, atorvastatin 80mg, metoprolol tart 25mg BID, and added Lisinopril 2.5mg daily  -holding Plavix prior to bronch/biopsy -Pt currently chest pain free  -Unclear if pt had true NSTEMI at [place]. Trop T at [facility] 0.05 to 0.02- may be residually elevated post cath  -EKG without evidence for prior MI or current ischemic changes  - Echo 2/13 revealed mildly reduced left ventricular systolic function. LVEF 47%, no regional WMA. Grade I diastolic dysfunction, mild MR.  -Transferred from [place] for MIDCAB for 70% calcified mLAD lesion noted on cath, however MIDCAB deferred by CT surgery pending biopsy of RML mass  - Continue aspirin 81mg, atorvastatin 80mg, metoprolol tart 25mg BID, and Lisinopril 2.5mg daily  - Plavix held prior to bronchoscopy/ biopsy, f/u with pulm about restarting Plavix post bronchoscopy

## 2020-02-18 NOTE — PROCEDURE NOTE - NSPROCDETAILS_GEN_ALL_CORE
positive blood return obtained via catheter/sutured in place/ultrasound guidance/hemostasis with direct pressure, dressing applied/location identified, draped/prepped, sterile technique used, needle inserted/introduced/connected to a pressurized flush line/all materials/supplies accounted for at end of procedure/Seldinger technique

## 2020-02-18 NOTE — PROGRESS NOTE ADULT - ASSESSMENT
80 y/o male, current smoker (1-2 PPD X67 years), with a PMHx of COPD, PVD s/p right BKA, s/p fem/popliteal bilateral bypass, chronic diastolic CHF, CAD s/p PCI 2005, DMII (insulin pump) who presented to [facility] with SOB and chest pain, was admitted for community acquired pneumonia, where he was found to have right sided middle lobe lung mass suspicious for malignancy. During the course of his work-up he was noted to have elevated troponin and diagnosed with NSTEMI, subsequently underwent abnormal stress test and cardiac catheterization demonstrated heavily calcified mid LAD stenosis (70%). The patient was transferred to [facility] under Dr. King Saez for evaluation of possible MIDCAB. However, coronary intervention is now held pending further workup of lung mass. Interventional pulmonary team consulted for lung biopsy scheduled for Tuesday 2/18. While on 9 Lachman pt had 5 second pause on telemetry, EP consulted, confirmed Mobitz type 1, likely due to high vagal tone. Pt was stepped down to 5 Uris on 02/14 for tele monitoring, pending bronch/biopsy on 2/18. 78 y/o male, current smoker (1-2 PPD X67 years), with a PMHx of COPD, PVD s/p right BKA, s/p fem/popliteal bilateral bypass, chronic diastolic CHF, CAD s/p PCI 2005, DMII (insulin pump) who was transferred from [facility] with incidental right sided middle lobe lung mass suspicious for malignancy, elevated troponin and diagnosed NSTEMI with abnormal stress test and cardiac catheterization revealing heavily calcified mild LAD stenosis (70%) for possible MIDCAB. Coronary intervention is now postponed pending further workup of lung mass. Interventional pulmonary team consulted for lung biopsy scheduled for Tuesday 2/18. While on 9 Lachman pt had 5 second pause on telemetry, EP consulted, confirmed Mobitz type 1, likely due to high vagal tone. Pt was stepped down to 5 Uris on 02/14 for tele monitoring, pending bronch/biopsy on 2/18.

## 2020-02-18 NOTE — PROGRESS NOTE ADULT - PROBLEM SELECTOR PLAN 7
Mag 1.9, pt was given Mag oxide 400mg PO x 1   - Continue to monitor        DVT PPx- SQH  Dispo- pending bronch/biopsy results and CTS recommendations, Pt consulted f/u recs     Case d/w Dr. Samaniego

## 2020-02-18 NOTE — PROGRESS NOTE ADULT - SUBJECTIVE AND OBJECTIVE BOX
S: Pt seen and examined bedside. Pt is comfortable and without complaints today. Pt understands he is going for bronchoscopy with biopsy today.   Patient denies C/P, SOB, N/V, dizziness, palpitations, and diaphoresis.  Pt denies fever/chills, dysuria, abdominal pain, diarrhea, and cough  12 Point ROS otherwise negative except as per HPI/subjective.     O: Vital Signs Last 24 Hrs  T(C): 36.7 (2020 13:52), Max: 37.3 (2020 22:06)  T(F): 98 (2020 13:52), Max: 99.1 (2020 22:06)  HR: 55 (2020 11:22) (53 - 68)  BP: 126/58 (2020 11:22) (117/60 - 162/70)  BP(mean): --  RR: 17 (2020 11:22) (15 - 18)  SpO2: 96% (2020 11:22) (95% - 98%)    PHYSICAL EXAM:  GEN: NAD  HEENT: No JVD  PULM:  CTA B/L  CARD:  RRR, S1 and S2   ABD: +BS, NT, soft/ND	  EXT: No Edema B/L LE  NEURO: A+Ox3, no focal deficit  PSYCH: Mood Appropriate    LABS:                        12.9   7.63  )-----------( 161      ( 2020 06:25 )             39.4         142  |  108  |  20  ----------------------------<  125<H>  4.0   |  22  |  1.13    Ca    9.0      2020 06:25  Mg     1.9           PT/INR - ( 2020 06:25 )   PT: 11.8 sec;   INR: 1.03          PTT - ( 2020 06:25 )  PTT:43.8 sec  Troponin T, Serum: 0.02 ng/mL (20 @ 07:29)  Troponin T, Serum: 0.05 ng/mL (20 @ 21:14)       @ 07:01  -   @ 07:00  --------------------------------------------------------  IN: 210 mL / OUT: 500 mL / NET: -290 mL     @ 07:01  -   @ 16:39  --------------------------------------------------------  IN: 0 mL / OUT: 100 mL / NET: -100 mL      Daily     Daily Weight in k.8 (2020 05:43) S: Pt seen and examined bedside. Pt is comfortable and without complaints today. Pt understands he is going for bronchoscopy with biopsy today.   Patient denies C/P, SOB, N/V, dizziness, palpitations, and diaphoresis.  Pt denies fever/chills, dysuria, abdominal pain, diarrhea, and cough  12 Point ROS otherwise negative except as per HPI/subjective.     O: Vital Signs Last 24 Hrs  T(C): 36.7 (2020 13:52), Max: 37.3 (2020 22:06)  T(F): 98 (2020 13:52), Max: 99.1 (2020 22:06)  HR: 55 (2020 11:22) (53 - 68)  BP: 126/58 (2020 11:22) (117/60 - 162/70)  BP(mean): --  RR: 17 (2020 11:22) (15 - 18)  SpO2: 96% (2020 11:22) (95% - 98%)    PHYSICAL EXAM:  GEN: NAD  HEENT: No JVD  PULM:  CTA B/L, no WRR  CARD:  distant heart sounds, no murmur  ABD: +BS, NT, soft/ND	  EXT: No Edema B/L LE, R BKA   NEURO: A+Ox3, no focal neuro deficits  PSYCH: Mood Appropriate    LABS:                        12.9   7.63  )-----------( 161      ( 2020 06:25 )             39.4         142  |  108  |  20  ----------------------------<  125<H>  4.0   |  22  |  1.13    Ca    9.0      2020 06:25  Mg     1.9           PT/INR - ( 2020 06:25 )   PT: 11.8 sec;   INR: 1.03          PTT - ( 2020 06:25 )  PTT:43.8 sec  Troponin T, Serum: 0.02 ng/mL (20 @ 07:29)  Troponin T, Serum: 0.05 ng/mL (20 @ 21:14)       @ 07:01  -   @ 07:00  --------------------------------------------------------  IN: 210 mL / OUT: 500 mL / NET: -290 mL     @ 07:01  -   @ 16:39  --------------------------------------------------------  IN: 0 mL / OUT: 100 mL / NET: -100 mL      Daily     Daily Weight in k.8 (2020 05:43)

## 2020-02-18 NOTE — PROGRESS NOTE ADULT - PROBLEM SELECTOR PLAN 1
-CT chest at [place] revealed right-sided middle lobe lung mass suspicious for malignancy  -MRI brain-no evidence brain mets but not conclusive 2/2 patient motion  -PET-CT- FDG avid right hilar mass, FGD avid right lower paratracheal and prevascular LN, bilateral hilar LN, palatine tonsil  - Interventional pulm consulted for bronchoscopy/biopsy on Tuesday 02/18/2020.   - Continue to hold Plavix prior to biopsy  -NPO after midnight - CT chest at [place] revealed right-sided middle lobe lung mass suspicious for malignancy  - MRI brain-no evidence of brain mets but not conclusive 2/2 patient motion  - PET-CT- FDG avid right hilar mass, FGD avid right lower paratracheal and prevascular LN, bilateral hilar LN, palatine tonsil  - Interventional pulm consulted for bronchoscopy/biopsy on Tuesday 02/18/2020, pt cleared by Dr. Samaniego  - F/u with pulm about restarting Plavix after bronchoscopy

## 2020-02-18 NOTE — PROCEDURE NOTE - NSINFORMCONSENT_GEN_A_CORE
Benefits, risks, and possible complications of procedure explained to patient/caregiver who verbalized understanding and gave verbal consent./BP monitoring for GA
Benefits, risks, and possible complications of procedure explained to patient/caregiver who verbalized understanding and gave written consent.

## 2020-02-18 NOTE — PROGRESS NOTE ADULT - SUBJECTIVE AND OBJECTIVE BOX
Patient discussed on morning rounds with Dr. Maxwell    Operation / Date: None    SUBJECTIVE ASSESSMENT:  79y Male seen and examined bedside. Patient is not offering any acute complaints and is asking about biopsy planned for today. Otherwise states he is tolerating PO diet and is voiding normally. Denies chest pain, SOB, palpitations, N/V/D, abdominal pain, dizziness, fever or chills.     Vital Signs Last 24 Hrs  T(C): 36.6 (18 Feb 2020 05:43), Max: 37.3 (17 Feb 2020 22:06)  T(F): 97.9 (18 Feb 2020 05:43), Max: 99.1 (17 Feb 2020 22:06)  HR: 53 (18 Feb 2020 08:34) (53 - 68)  BP: 118/59 (18 Feb 2020 08:34) (114/56 - 162/70)  BP(mean): --  RR: 18 (18 Feb 2020 08:34) (15 - 18)  SpO2: 95% (18 Feb 2020 08:34) (95% - 98%)  I&O's Detail    17 Feb 2020 07:01  -  18 Feb 2020 07:00  --------------------------------------------------------  IN:    Oral Fluid: 210 mL  Total IN: 210 mL    OUT:    Voided: 500 mL  Total OUT: 500 mL    Total NET: -290 mL    PHYSICAL EXAM:    General: Patient sitting comfortably in the chair, no acute distress     Neurological: Alert and oriented. No focal neurological deficits     Cardiovascular: S1S2, RRR, no murmurs appreciated on exam     Respiratory: Clear to ausculation bilaterally, no w/r/r     Gastrointestinal: Abdomen soft, non tender, non distended, +bowel sounds    Extremities: +R BKA, otherwise warm and well perfused. No edema or calf tenderness     Vascular: Peripheral pulses palpable bilaterally    LABS:                        12.9   7.63  )-----------( 161      ( 18 Feb 2020 06:25 )             39.4       COUMADIN: No.  PT/INR - ( 18 Feb 2020 06:25 )   PT: 11.8 sec;   INR: 1.03          PTT - ( 18 Feb 2020 06:25 )  PTT:43.8 sec    02-18    142  |  108  |  20  ----------------------------<  125<H>  4.0   |  22  |  1.13    Ca    9.0      18 Feb 2020 06:25  Mg     1.9     02-18            MEDICATIONS  (STANDING):  amLODIPine   Tablet 5 milliGRAM(s) Oral daily  aspirin enteric coated 81 milliGRAM(s) Oral daily  atorvastatin 80 milliGRAM(s) Oral at bedtime  buDESOnide    Inhalation Suspension 0.5 milliGRAM(s) Inhalation two times a day  chlorhexidine 0.12% Liquid 10 milliLiter(s) Swish and Spit once  dextrose 5%. 1000 milliLiter(s) (50 mL/Hr) IV Continuous <Continuous>  dextrose 50% Injectable 12.5 Gram(s) IV Push once  dextrose 50% Injectable 25 Gram(s) IV Push once  dextrose 50% Injectable 25 Gram(s) IV Push once  famotidine    Tablet 20 milliGRAM(s) Oral daily  heparin  Injectable 5000 Unit(s) SubCutaneous every 8 hours  insulin lispro (HumaLOG) corrective regimen sliding scale   SubCutaneous Before meals and at bedtime  isosorbide   mononitrate ER Tablet (IMDUR) 60 milliGRAM(s) Oral daily  lidocaine   Patch 2 Patch Transdermal every 24 hours  lisinopril 5 milliGRAM(s) Oral daily  magnesium oxide 400 milliGRAM(s) Oral once  metoprolol tartrate 25 milliGRAM(s) Oral two times a day  sodium chloride 0.9% lock flush 3 milliLiter(s) IV Push every 8 hours    MEDICATIONS  (PRN):  ALBUTerol    90 MICROgram(s) HFA Inhaler 2 Puff(s) Inhalation every 4 hours PRN Shortness of Breath and/or Wheezing  dextrose 40% Gel 15 Gram(s) Oral once PRN Blood Glucose LESS THAN 70 milliGRAM(s)/deciliter  glucagon  Injectable 1 milliGRAM(s) IntraMuscular once PRN Glucose LESS THAN 70 milligrams/deciliter        RADIOLOGY & ADDITIONAL TESTS:  < from: MR Head w/wo IV Cont (02.13.20 @ 14:16) >  IMPRESSION:     Markedly motion degraded study. While there is no large mass or enhancing focus in the brain, small sites of metastatic disease are not excluded.    A punctate focus of signal in the right superior thalamus is questionable for recent lacunar infarction. There is background of moderate small vessel ischemic change in cerebral white matter. Occlusion versus slow flow of the right vertebral artery.    < end of copied text >    < from: NM PET/CT Onc FDG Skull to Thigh, Inital (02.13.20 @ 17:17) >  Impression:     FDG avid right hilar mass corresponding to findings on recent CT chest suspicious for malignancy. FDG avid right lower paratracheal and prevascular lymph nodes suspicious for malignancy.     FDG avid bilateral hilar lymph nodes which are in a pattern typical of inflammatory/infectious etiology although given the presence of a right hilar mass neoplasm cannot be excluded.    Focal uptake within the right palatine tonsil which is nonspecific, correlation with direct visualization is recommended.        < end of copied text >

## 2020-02-18 NOTE — PROGRESS NOTE ADULT - REASON FOR ADMISSION
"I had trouble breathing".
coronary artery disease
"I had trouble breathing".
coronary artery disease

## 2020-02-18 NOTE — PROGRESS NOTE ADULT - PROBLEM SELECTOR PLAN 3
- Telemetry with a 5-sec pause on 2/13 for which EP consulted- consistent with mobitz type 1  -per EP, continue metoprolol tart 25mg BID  -no indication for PPM - Telemetry with a 5-sec pause on 2/13 for which EP consulted- consistent with mobitz type 1  - per EP, continue metoprolol tart 25mg BID  - no indication for PPM  - Continue to monitor tele

## 2020-02-18 NOTE — PROCEDURE NOTE - ADDITIONAL PROCEDURE DETAILS
EBUS with TBNA of mediastinal and hilar LN performed. Bronch with endobronchial biopsy of segments of RUL performed.  For details, see final bronchoscopy note.

## 2020-02-18 NOTE — PROGRESS NOTE ADULT - PROBLEM SELECTOR PLAN 5
-Respiratory status stable, requiring 1-2L O2. No wheeze  -Continue standing budesonide and PRN albuterol -Respiratory status stable, saturating 96% on RA.   -Continue standing budesonide and PRN albuterol

## 2020-02-18 NOTE — PROGRESS NOTE ADULT - ATTENDING COMMENTS
I have personally seen, examined, and participated in the care of this patient. I have reviewed all pertinent clinical information, including history, physical exam, plan and the PA's note and agree with the above.    -Without active complaints - no CP/SOB  -Euvolemic on exam  -Awaiting bronch with biopsy today  -Pending results will start plavix (given untreated 70 mlad lesion)  -Cont ASA/statin/metoprolol  -Anticipate DC 2/19

## 2020-02-19 ENCOUNTER — TRANSCRIPTION ENCOUNTER (OUTPATIENT)
Age: 80
End: 2020-02-19

## 2020-02-19 VITALS — TEMPERATURE: 99 F

## 2020-02-19 PROBLEM — R91.8 OTHER NONSPECIFIC ABNORMAL FINDING OF LUNG FIELD: Chronic | Status: ACTIVE | Noted: 2020-02-12

## 2020-02-19 PROBLEM — I73.9 PERIPHERAL VASCULAR DISEASE, UNSPECIFIED: Chronic | Status: ACTIVE | Noted: 2020-02-12

## 2020-02-19 PROBLEM — E78.5 HYPERLIPIDEMIA, UNSPECIFIED: Chronic | Status: ACTIVE | Noted: 2020-02-12

## 2020-02-19 PROBLEM — E11.9 TYPE 2 DIABETES MELLITUS WITHOUT COMPLICATIONS: Chronic | Status: ACTIVE | Noted: 2020-02-12

## 2020-02-19 PROBLEM — I25.10 ATHEROSCLEROTIC HEART DISEASE OF NATIVE CORONARY ARTERY WITHOUT ANGINA PECTORIS: Chronic | Status: ACTIVE | Noted: 2020-02-12

## 2020-02-19 PROBLEM — I10 ESSENTIAL (PRIMARY) HYPERTENSION: Chronic | Status: ACTIVE | Noted: 2020-02-12

## 2020-02-19 PROBLEM — I50.32 CHRONIC DIASTOLIC (CONGESTIVE) HEART FAILURE: Chronic | Status: ACTIVE | Noted: 2020-02-12

## 2020-02-19 LAB
ANION GAP SERPL CALC-SCNC: 13 MMOL/L — SIGNIFICANT CHANGE UP (ref 5–17)
BUN SERPL-MCNC: 32 MG/DL — HIGH (ref 7–23)
CALCIUM SERPL-MCNC: 8.6 MG/DL — SIGNIFICANT CHANGE UP (ref 8.4–10.5)
CHLORIDE SERPL-SCNC: 106 MMOL/L — SIGNIFICANT CHANGE UP (ref 96–108)
CO2 SERPL-SCNC: 21 MMOL/L — LOW (ref 22–31)
CREAT SERPL-MCNC: 1.46 MG/DL — HIGH (ref 0.5–1.3)
GLUCOSE BLDC GLUCOMTR-MCNC: 241 MG/DL — HIGH (ref 70–99)
GLUCOSE BLDC GLUCOMTR-MCNC: 323 MG/DL — HIGH (ref 70–99)
GLUCOSE SERPL-MCNC: 359 MG/DL — HIGH (ref 70–99)
HCT VFR BLD CALC: 36.7 % — LOW (ref 39–50)
HGB BLD-MCNC: 12.1 G/DL — LOW (ref 13–17)
MAGNESIUM SERPL-MCNC: 1.9 MG/DL — SIGNIFICANT CHANGE UP (ref 1.6–2.6)
MCHC RBC-ENTMCNC: 31.8 PG — SIGNIFICANT CHANGE UP (ref 27–34)
MCHC RBC-ENTMCNC: 33 GM/DL — SIGNIFICANT CHANGE UP (ref 32–36)
MCV RBC AUTO: 96.3 FL — SIGNIFICANT CHANGE UP (ref 80–100)
NIGHT BLUE STAIN TISS: SIGNIFICANT CHANGE UP
NRBC # BLD: 0 /100 WBCS — SIGNIFICANT CHANGE UP (ref 0–0)
PLATELET # BLD AUTO: 171 K/UL — SIGNIFICANT CHANGE UP (ref 150–400)
POTASSIUM SERPL-MCNC: 4.5 MMOL/L — SIGNIFICANT CHANGE UP (ref 3.5–5.3)
POTASSIUM SERPL-SCNC: 4.5 MMOL/L — SIGNIFICANT CHANGE UP (ref 3.5–5.3)
RBC # BLD: 3.81 M/UL — LOW (ref 4.2–5.8)
RBC # FLD: 13.1 % — SIGNIFICANT CHANGE UP (ref 10.3–14.5)
SODIUM SERPL-SCNC: 140 MMOL/L — SIGNIFICANT CHANGE UP (ref 135–145)
SPECIMEN SOURCE: SIGNIFICANT CHANGE UP
WBC # BLD: 8.58 K/UL — SIGNIFICANT CHANGE UP (ref 3.8–10.5)
WBC # FLD AUTO: 8.58 K/UL — SIGNIFICANT CHANGE UP (ref 3.8–10.5)

## 2020-02-19 PROCEDURE — 88341 IMHCHEM/IMCYTCHM EA ADD ANTB: CPT

## 2020-02-19 PROCEDURE — 84484 ASSAY OF TROPONIN QUANT: CPT

## 2020-02-19 PROCEDURE — 88112 CYTOPATH CELL ENHANCE TECH: CPT

## 2020-02-19 PROCEDURE — 88305 TISSUE EXAM BY PATHOLOGIST: CPT

## 2020-02-19 PROCEDURE — 70553 MRI BRAIN STEM W/O & W/DYE: CPT

## 2020-02-19 PROCEDURE — 93005 ELECTROCARDIOGRAM TRACING: CPT

## 2020-02-19 PROCEDURE — A9585: CPT

## 2020-02-19 PROCEDURE — A9552: CPT

## 2020-02-19 PROCEDURE — 88173 CYTOPATH EVAL FNA REPORT: CPT

## 2020-02-19 PROCEDURE — 84100 ASSAY OF PHOSPHORUS: CPT

## 2020-02-19 PROCEDURE — 87070 CULTURE OTHR SPECIMN AEROBIC: CPT

## 2020-02-19 PROCEDURE — 85610 PROTHROMBIN TIME: CPT

## 2020-02-19 PROCEDURE — 94640 AIRWAY INHALATION TREATMENT: CPT

## 2020-02-19 PROCEDURE — 87206 SMEAR FLUORESCENT/ACID STAI: CPT

## 2020-02-19 PROCEDURE — 83735 ASSAY OF MAGNESIUM: CPT

## 2020-02-19 PROCEDURE — 82962 GLUCOSE BLOOD TEST: CPT

## 2020-02-19 PROCEDURE — 87102 FUNGUS ISOLATION CULTURE: CPT

## 2020-02-19 PROCEDURE — 94150 VITAL CAPACITY TEST: CPT

## 2020-02-19 PROCEDURE — 85027 COMPLETE CBC AUTOMATED: CPT

## 2020-02-19 PROCEDURE — 86850 RBC ANTIBODY SCREEN: CPT

## 2020-02-19 PROCEDURE — 97161 PT EVAL LOW COMPLEX 20 MIN: CPT

## 2020-02-19 PROCEDURE — 87116 MYCOBACTERIA CULTURE: CPT

## 2020-02-19 PROCEDURE — 80053 COMPREHEN METABOLIC PANEL: CPT

## 2020-02-19 PROCEDURE — 82803 BLOOD GASES ANY COMBINATION: CPT

## 2020-02-19 PROCEDURE — 99238 HOSP IP/OBS DSCHRG MGMT 30/<: CPT

## 2020-02-19 PROCEDURE — 36415 COLL VENOUS BLD VENIPUNCTURE: CPT

## 2020-02-19 PROCEDURE — 86901 BLOOD TYPING SEROLOGIC RH(D): CPT

## 2020-02-19 PROCEDURE — 81001 URINALYSIS AUTO W/SCOPE: CPT

## 2020-02-19 PROCEDURE — 83880 ASSAY OF NATRIURETIC PEPTIDE: CPT

## 2020-02-19 PROCEDURE — 71045 X-RAY EXAM CHEST 1 VIEW: CPT

## 2020-02-19 PROCEDURE — 87015 SPECIMEN INFECT AGNT CONCNTJ: CPT

## 2020-02-19 PROCEDURE — C8929: CPT

## 2020-02-19 PROCEDURE — 80048 BASIC METABOLIC PNL TOTAL CA: CPT

## 2020-02-19 PROCEDURE — 82550 ASSAY OF CK (CPK): CPT

## 2020-02-19 PROCEDURE — 80061 LIPID PANEL: CPT

## 2020-02-19 PROCEDURE — 84436 ASSAY OF TOTAL THYROXINE: CPT

## 2020-02-19 PROCEDURE — 83036 HEMOGLOBIN GLYCOSYLATED A1C: CPT

## 2020-02-19 PROCEDURE — 93880 EXTRACRANIAL BILAT STUDY: CPT

## 2020-02-19 PROCEDURE — 85730 THROMBOPLASTIN TIME PARTIAL: CPT

## 2020-02-19 PROCEDURE — 82553 CREATINE MB FRACTION: CPT

## 2020-02-19 PROCEDURE — 84443 ASSAY THYROID STIM HORMONE: CPT

## 2020-02-19 PROCEDURE — 78815 PET IMAGE W/CT SKULL-THIGH: CPT

## 2020-02-19 PROCEDURE — 88344 IMHCHEM/IMCYTCHM EA MLT ANTB: CPT

## 2020-02-19 RX ORDER — CARVEDILOL PHOSPHATE 80 MG/1
1 CAPSULE, EXTENDED RELEASE ORAL
Qty: 0 | Refills: 0 | DISCHARGE

## 2020-02-19 RX ORDER — MAGNESIUM OXIDE 400 MG ORAL TABLET 241.3 MG
400 TABLET ORAL ONCE
Refills: 0 | Status: COMPLETED | OUTPATIENT
Start: 2020-02-19 | End: 2020-02-19

## 2020-02-19 RX ORDER — AMLODIPINE BESYLATE 2.5 MG/1
1 TABLET ORAL
Qty: 30 | Refills: 3
Start: 2020-02-19 | End: 2020-06-17

## 2020-02-19 RX ORDER — ISOSORBIDE MONONITRATE 60 MG/1
1 TABLET, EXTENDED RELEASE ORAL
Qty: 0 | Refills: 0 | DISCHARGE
Start: 2020-02-19

## 2020-02-19 RX ORDER — SODIUM CHLORIDE 9 MG/ML
500 INJECTION INTRAMUSCULAR; INTRAVENOUS; SUBCUTANEOUS
Refills: 0 | Status: DISCONTINUED | OUTPATIENT
Start: 2020-02-19 | End: 2020-02-19

## 2020-02-19 RX ORDER — ISOSORBIDE MONONITRATE 60 MG/1
1 TABLET, EXTENDED RELEASE ORAL
Qty: 30 | Refills: 3
Start: 2020-02-19 | End: 2020-06-17

## 2020-02-19 RX ORDER — AMLODIPINE BESYLATE 2.5 MG/1
1 TABLET ORAL
Qty: 0 | Refills: 0 | DISCHARGE

## 2020-02-19 RX ORDER — METOPROLOL TARTRATE 50 MG
1 TABLET ORAL
Qty: 60 | Refills: 3
Start: 2020-02-19 | End: 2020-06-17

## 2020-02-19 RX ORDER — ATORVASTATIN CALCIUM 80 MG/1
1 TABLET, FILM COATED ORAL
Qty: 30 | Refills: 3
Start: 2020-02-19 | End: 2020-06-17

## 2020-02-19 RX ORDER — LISINOPRIL 2.5 MG/1
1 TABLET ORAL
Qty: 30 | Refills: 3
Start: 2020-02-19 | End: 2020-06-17

## 2020-02-19 RX ORDER — ROSUVASTATIN CALCIUM 5 MG/1
1 TABLET ORAL
Qty: 0 | Refills: 0 | DISCHARGE

## 2020-02-19 RX ADMIN — ISOSORBIDE MONONITRATE 60 MILLIGRAM(S): 60 TABLET, EXTENDED RELEASE ORAL at 12:14

## 2020-02-19 RX ADMIN — HEPARIN SODIUM 5000 UNIT(S): 5000 INJECTION INTRAVENOUS; SUBCUTANEOUS at 13:20

## 2020-02-19 RX ADMIN — LISINOPRIL 5 MILLIGRAM(S): 2.5 TABLET ORAL at 06:00

## 2020-02-19 RX ADMIN — HEPARIN SODIUM 5000 UNIT(S): 5000 INJECTION INTRAVENOUS; SUBCUTANEOUS at 06:00

## 2020-02-19 RX ADMIN — AMLODIPINE BESYLATE 10 MILLIGRAM(S): 2.5 TABLET ORAL at 06:00

## 2020-02-19 RX ADMIN — Medication 8: at 06:56

## 2020-02-19 RX ADMIN — FAMOTIDINE 20 MILLIGRAM(S): 10 INJECTION INTRAVENOUS at 12:14

## 2020-02-19 RX ADMIN — MAGNESIUM OXIDE 400 MG ORAL TABLET 400 MILLIGRAM(S): 241.3 TABLET ORAL at 09:41

## 2020-02-19 RX ADMIN — SODIUM CHLORIDE 50 MILLILITER(S): 9 INJECTION INTRAMUSCULAR; INTRAVENOUS; SUBCUTANEOUS at 09:41

## 2020-02-19 RX ADMIN — LIDOCAINE 2 PATCH: 4 CREAM TOPICAL at 07:00

## 2020-02-19 RX ADMIN — Medication 0.5 MILLIGRAM(S): at 09:41

## 2020-02-19 RX ADMIN — Medication 4: at 13:20

## 2020-02-19 RX ADMIN — Medication 81 MILLIGRAM(S): at 12:14

## 2020-02-19 RX ADMIN — SODIUM CHLORIDE 3 MILLILITER(S): 9 INJECTION INTRAMUSCULAR; INTRAVENOUS; SUBCUTANEOUS at 13:22

## 2020-02-19 RX ADMIN — SODIUM CHLORIDE 3 MILLILITER(S): 9 INJECTION INTRAMUSCULAR; INTRAVENOUS; SUBCUTANEOUS at 05:53

## 2020-02-19 NOTE — DISCHARGE NOTE PROVIDER - NSDCMRMEDTOKEN_GEN_ALL_CORE_FT
amLODIPine 10 mg oral tablet: 1 tab(s) orally once a day  atorvastatin 80 mg oral tablet: 1 tab(s) orally once a day (at bedtime)  Ecotrin Adult Low Strength 81 mg oral delayed release tablet: 1 tab(s) orally once a day  famotidine 40 mg oral tablet: 1 tab(s) orally once a day (at bedtime)  isosorbide mononitrate 60 mg oral tablet, extended release: 1 tab(s) orally once a day  lisinopril 5 mg oral tablet: 1 tab(s) orally once a day  metoprolol tartrate 25 mg oral tablet: 1 tab(s) orally 2 times a day  Nicotine System Kit transdermal film, extended release: 1 each transdermal once a day  Plavix 75 mg oral tablet: 1 tab(s) orally once a day

## 2020-02-19 NOTE — DISCHARGE NOTE PROVIDER - HOSPITAL COURSE
80 y/o Latvian-speaking male, current smoker 1-2 PPD x 67 years, PMH COPD PVD, s/p Right below knee amputation, s/p fem/pop bypass bilateral?, diastolic CHF, CAD, PCI 2005, DMII (insulin pump) who for the last week c/o SOB. Then most recently while home experienced SOB along with chest pain. Pt was taken to [facility] ED by ambulance where he was admitted for community acquired pneumonia. CT scan showed right sided middle lobe lung mass suspicious for malignancy and was also diagnosed with NSTEMI. Pt underwent stress testing which was abnormal. Pt also underwent cardiac cath which demonstrated calcified mid LAD stenosis 70%. Pt was transferred to [facility] under Dr. King Saez for evaluation and possible MIDCAB. Coronary interventtion was subsequently postponed pending evaluation of lung mass. Pt underwent bronchoscopy/biopsy with interventional pulmonology on 2/18/20. Pt has been chest pain free since admission. Pt was found to have Mobitz type I second degree AV block with 5 second pause on telemetry on 2/13. EP was consulted and determined no need to PPM at this time, likely due to high vagal tone and recommended to continue metoprolol. Pt was seen and examined at bedside. Pt is without complaints and feeling well. VSS. Cr. elevated at 1.46 on 2/19/20, pt was given IVF and encouraged to drink fluids. Mag 1.9 was repleted with Magnesium oxide 400mg PO x 1. Physical exam WNL. Pt was seen and examined by attending Dr. Samaniego and deemed stable for discharge. Pt will be discharged on Amlodipine 10mg daily, Lisinopril 5mg daily, metoprolol tartrate 25mg twice daily, aspirin 81mg daily, Atorvastatin 80mg daily and Imdur 60mg daily. Pt safe to resume Plavix 75mg daily per pulmonology. Pt is to follow up with pulmonologist Dr. Guerrero in 1 week for bronchoscopy/biopsy results and further recommendations. Pt is also to follow up with cardiologist Dr. Go in 1- 2 weeks of discharge. Pt advised to proceed to the ED if symptoms of CP, SOB, palpitations, LOC. Pt understood and agreeable to plan. PT recommends home physical therapy 2-3 x week. Pt has home HHA 7 days a week for 5.5 hours daily. Pt to be discharged home with his daughter. 80 y/o Tamaz[place]-speaking male, current smoker 1-2 PPD x 67 years, PMH COPD PVD, s/p Right below knee amputation, s/p fem/pop bypass bilateral?, diastolic CHF, CAD, PCI 2005, DMII (insulin pump) who for the last week c/o SOB. Then most recently while home experienced SOB along with chest pain. Pt was taken to [facility] ED by ambulance where he was admitted for community acquired pneumonia. CT scan showed right sided middle lobe lung mass suspicious for malignancy and was also diagnosed with NSTEMI. Pt underwent stress testing which was abnormal. Pt also underwent cardiac cath which demonstrated calcified mid LAD stenosis 70%. Pt was transferred to [facility] under Dr. King Saez for evaluation and possible MIDCAB. Coronary interventtion was subsequently postponed pending evaluation of lung mass. Pt underwent bronchoscopy/biopsy with interventional pulmonology on 2/18/20. Pt has been chest pain free since admission. Pt was found to have Mobitz type I second degree AV block with 5 second pause on telemetry on 2/13. EP was consulted and determined no need to PPM at this time, likely due to high vagal tone and recommended to continue metoprolol. Pt was seen and examined at bedside. Pt is without complaints and feeling well. VSS. Cr. elevated at 1.46 on 2/19/20, pt was given IVF and encouraged to drink fluids. Mag 1.9 was repleted with Magnesium oxide 400mg PO x 1. Physical exam WNL. Pt was seen and examined by attending Dr. Samaniego and deemed stable for discharge. Pt will be discharged on Amlodipine 10mg daily, Lisinopril 5mg daily, metoprolol tartrate 25mg twice daily, aspirin 81mg daily, and Atorvastatin 80mg daily. Pt was started on Imdur 60mg daily. Pt safe to resume Plavix 75mg daily per pulmonology. Pt is to follow up with pulmonologist Dr. Guerrero in 1 week for bronchoscopy/biopsy results and further recommendations. Pt is also to follow up with cardiologist Dr. Go or Yulissa in 1- 2 weeks of discharge. Pt advised to proceed to the ED if symptoms of CP, SOB, palpitations, LOC. Pt understood and agreeable to plan. PT recommends home physical therapy 2-3 x week. Pt has home HHA 7 days a week for 5.5 hours daily. Pt to be discharged home with his daughter.

## 2020-02-19 NOTE — DISCHARGE NOTE PROVIDER - CARE PROVIDER_API CALL
Richy Guerrero)  Pulmonary Disease  [address], [address]  [place] [address]  Phone: [phone]  Fax: [phone]  Follow Up Time:     Donavan Chang)  Cardiology  Vascular  [address] [place] [address]  Phone: [phone]  Fax: [phone]  Follow Up Time:

## 2020-02-19 NOTE — DISCHARGE NOTE PROVIDER - CARE PROVIDERS DIRECT ADDRESSES
[email or web address]

## 2020-02-19 NOTE — PHYSICAL THERAPY INITIAL EVALUATION ADULT - GAIT DEVIATIONS NOTED, PT EVAL
increased time in double stance/decreased livia/decreased weight-shifting ability/decreased step length

## 2020-02-19 NOTE — PHYSICAL THERAPY INITIAL EVALUATION ADULT - PERTINENT HX OF CURRENT PROBLEM, REHAB EVAL
80 y/o male, current smoker (1-2 PPD X67 years), with a PMHx of COPD, PVD s/p right BKA, s/p fem/popliteal bilateral bypass, chronic diastolic CHF, CAD s/p PCI 2005, DMII (insulin pump) who was transferred from [facility] with incidental right sided middle lobe lung mass suspicious for malignancy, elevated troponin and diagnosed NSTEMI with abnormal stress test and cardiac catheterization revealing heavily calcified mild LAD stenosis (70%) for possible MIDCAB

## 2020-02-19 NOTE — DISCHARGE NOTE NURSING/CASE MANAGEMENT/SOCIAL WORK - NSDCPEWEB_GEN_ALL_CORE
[facility] for Tobacco Control website --- [email or web address] website --- [email or web address]

## 2020-02-19 NOTE — DISCHARGE NOTE NURSING/CASE MANAGEMENT/SOCIAL WORK - NSDCPEEMAIL_GEN_ALL_CORE
[facility] for Tobacco Control email [email or web address]

## 2020-02-19 NOTE — CHART NOTE - NSCHARTNOTEFT_GEN_A_CORE
80 y/o Male, current smoker (1-2 PPD x67 years), with a PMHx of COPD, PVD s/p right BKA, s/p fem/popliteal bilateral bypass, diastolic CHF, CAD, PCI 2005, DMII (insulin pump) who was taken via ambulance to [facility] ED with SOB and chest pain and was admitted for community acquired pneumonia. A CT scan completed there showed right sided middle lobe lung mass suspicious for malignancy and upon further workup he was diagnosed with an NSTEMI. Stress testing was positive and cardiac catheterization demonstrated mid LAD stenosis (70%). The patient was transferred to [facility] under the care of Dr. Saez, CTSx, for evaluation and management. Patient deemed not a cardiac surgical candidate at this time, and patient transferred under Dr. Samaniego's service. Patient underwent IR biopsy of lung mass with interventional pulmonology.     Plan:  - Case discussed with Dr. Saez, Cardiac Surgery, and Dr. Maxwell, Thoracic Surgery.  - We will be signing off.   - Please have patient follow up with Dr. Tomlinson, Cardiology, within 1-2 weeks from the date of discharge.        - Address is:          [address] [place] [address]       - Phone number is: #[phone].   - Thank you for this consultation.

## 2020-02-19 NOTE — DISCHARGE NOTE NURSING/CASE MANAGEMENT/SOCIAL WORK - NSDCFUADDAPPT_GEN_ALL_CORE_FT
Please follow up with pulmonologist Dr. Guerrero at [facility] 4th floor (4East Pulmonary department) on 2/27/20 at 11:30AM for results of pulmonary biopsy. If you need to reschedule this important appointment please call [phone].     Please follow up with cardiologists Dr. Go or Dr. Duenas within 1-2 weeks of discharge for follow up.

## 2020-02-19 NOTE — PHYSICAL THERAPY INITIAL EVALUATION ADULT - CRITERIA FOR SKILLED THERAPEUTIC INTERVENTIONS
risk reduction/prevention/therapy frequency/rehab potential/impairments found/anticipated discharge recommendation/functional limitations in following categories

## 2020-02-19 NOTE — PHYSICAL THERAPY INITIAL EVALUATION ADULT - IMPAIRMENTS FOUND, PT EVAL
gait, locomotion, and balance/integumentary integrity/posture/joint integrity and mobility/gross motor/aerobic capacity/endurance/fine motor/muscle strength

## 2020-02-19 NOTE — DISCHARGE NOTE PROVIDER - NSDCCPCAREPLAN_GEN_ALL_CORE_FT
PRINCIPAL DISCHARGE DIAGNOSIS  Diagnosis: Mass of right lung  Assessment and Plan of Treatment: You were found to have a mass in your right lung. You underwent a bronchoscopy and biopsy of the lung to evaluate what the mass is. Please follow up with pulmologist Dr. Guerrero for results of the biopsy and further recommendations.      SECONDARY DISCHARGE DIAGNOSES  Diagnosis: Hyperlipidemia  Assessment and Plan of Treatment: You have a history of high cholesterol. You were started on Atorvastatin 80mg daily to prevent further heart disease. Please discontinue Rosuvastatin 20mg.    Diagnosis: Hypertension  Assessment and Plan of Treatment: You have a history of elevated blood pressure and you should continue to take metoprolol tartrate 25mg twice daily and Lisinopril 5mg daily and Amlodipine was increased from 5mg to 10mg daily. Please take as directed.    Diagnosis: Second degree Mobitz I AV block  Assessment and Plan of Treatment: You were found to have a pause and a heart block on EKG while in the hospital. You were seen by electrophysiologist who is not recommending any pacemaker intervention at this time. Please continue to follow up with your cardiologist.    Diagnosis: CAD (coronary artery disease)  Assessment and Plan of Treatment: You have a diagnosis of coronary artery disease. You have been taking Aspirin 81mg daily and Plavix (Clopidogrel) 75mg daily. You MUST continue taking the daily Aspirin and Plavix. You were found to have blockage in your left anterior descending artery which will be managed with medications until further workup of your lung mass is completed. DO NOT STOP THESE MEDICATIONS FOR ANY REASON UNLESS OTHERWISE INDICATED BY YOUR CARDIOLOGIST BECAUSE THIS WILL PUT YOU AT RISK FOR A HEART ATTACK. Please make a follow up appointment with your cardiologist within 1-2 weeks of your discharge. All of your prescriptions have been sent electronically to your pharmacy.  You were started on Isosorbide mononitrate ER 60mg daily, please take as directed.

## 2020-02-19 NOTE — DISCHARGE NOTE PROVIDER - NSDCFUADDAPPT_GEN_ALL_CORE_FT
Please follow up with Dr. Guerrero in 1 week for results of pulmonary biopsy.   Please follow up with cardiologists Dr. Go or Dr. Duenas within 1-2 weeks of discharge for follow up. Please follow up with pulmonologist Dr. Guerrero at [facility] 4th floor (4East Pulmonary department) on 2/27/20 at 11:30AM for results of pulmonary biopsy. If you need to reschedule this important appointment please call [phone].     Please follow up with cardiologists Dr. Go or Dr. Duenas within 1-2 weeks of discharge for follow up.

## 2020-02-19 NOTE — PHYSICAL THERAPY INITIAL EVALUATION ADULT - ADDITIONAL COMMENTS
pt lives alone in an elevator access apt building w/ no stairs to enter. States that he has a home health aide x5hrs, 7 days/wk. States that he ambulates within his apartment using a prosthetic and RW, and has a wheelchair for community ambulation. His daughter lives in [place] and is able to assist him if needed. Denies hx of recent falls.

## 2020-02-19 NOTE — DISCHARGE NOTE NURSING/CASE MANAGEMENT/SOCIAL WORK - PATIENT PORTAL LINK FT
You can access the FollowMyHealth Patient Portal offered by [facility] by registering at the following website: [email or web address]. By joining [facility]’s FollowMyHealth portal, you will also be able to view your health information using other applications (apps) compatible with our system.

## 2020-02-20 LAB
CULTURE RESULTS: SIGNIFICANT CHANGE UP
NON-GYNECOLOGICAL CYTOLOGY STUDY: SIGNIFICANT CHANGE UP
SPECIMEN SOURCE: SIGNIFICANT CHANGE UP
SURGICAL PATHOLOGY STUDY: SIGNIFICANT CHANGE UP

## 2020-02-21 LAB
NON-GYNECOLOGICAL CYTOLOGY STUDY: SIGNIFICANT CHANGE UP
NON-GYNECOLOGICAL CYTOLOGY STUDY: SIGNIFICANT CHANGE UP

## 2020-02-27 ENCOUNTER — APPOINTMENT (OUTPATIENT)
Dept: PULMONOLOGY | Facility: CLINIC | Age: 80
End: 2020-02-27
Payer: MEDICARE

## 2020-02-27 VITALS
SYSTOLIC BLOOD PRESSURE: 100 MMHG | HEART RATE: 58 BPM | RESPIRATION RATE: 12 BRPM | DIASTOLIC BLOOD PRESSURE: 60 MMHG | HEIGHT: 65 IN | TEMPERATURE: 98.4 F | OXYGEN SATURATION: 92 %

## 2020-02-27 DIAGNOSIS — Z86.79 PERSONAL HISTORY OF OTHER DISEASES OF THE CIRCULATORY SYSTEM: ICD-10-CM

## 2020-02-27 DIAGNOSIS — C34.90 MALIGNANT NEOPLASM OF UNSPECIFIED PART OF UNSPECIFIED BRONCHUS OR LUNG: ICD-10-CM

## 2020-02-27 DIAGNOSIS — Z95.5 PRESENCE OF CORONARY ANGIOPLASTY IMPLANT AND GRAFT: ICD-10-CM

## 2020-02-27 DIAGNOSIS — E11.22 TYPE 2 DIABETES MELLITUS WITH DIABETIC CHRONIC KIDNEY DISEASE: ICD-10-CM

## 2020-02-27 DIAGNOSIS — I25.5 ISCHEMIC CARDIOMYOPATHY: ICD-10-CM

## 2020-02-27 DIAGNOSIS — F17.210 NICOTINE DEPENDENCE, CIGARETTES, UNCOMPLICATED: ICD-10-CM

## 2020-02-27 DIAGNOSIS — F17.200 NICOTINE DEPENDENCE, UNSPECIFIED, UNCOMPLICATED: ICD-10-CM

## 2020-02-27 DIAGNOSIS — Z79.82 LONG TERM (CURRENT) USE OF ASPIRIN: ICD-10-CM

## 2020-02-27 DIAGNOSIS — I13.0 HYPERTENSIVE HEART AND CHRONIC KIDNEY DISEASE WITH HEART FAILURE AND STAGE 1 THROUGH STAGE 4 CHRONIC KIDNEY DISEASE, OR UNSPECIFIED CHRONIC KIDNEY DISEASE: ICD-10-CM

## 2020-02-27 DIAGNOSIS — C77.1 SECONDARY AND UNSPECIFIED MALIGNANT NEOPLASM OF INTRATHORACIC LYMPH NODES: ICD-10-CM

## 2020-02-27 DIAGNOSIS — E11.51 TYPE 2 DIABETES MELLITUS WITH DIABETIC PERIPHERAL ANGIOPATHY WITHOUT GANGRENE: ICD-10-CM

## 2020-02-27 DIAGNOSIS — Z86.39 PERSONAL HISTORY OF OTHER ENDOCRINE, NUTRITIONAL AND METABOLIC DISEASE: ICD-10-CM

## 2020-02-27 DIAGNOSIS — I44.1 ATRIOVENTRICULAR BLOCK, SECOND DEGREE: ICD-10-CM

## 2020-02-27 DIAGNOSIS — Z99.3 DEPENDENCE ON WHEELCHAIR: ICD-10-CM

## 2020-02-27 DIAGNOSIS — C34.11 MALIGNANT NEOPLASM OF UPPER LOBE, RIGHT BRONCHUS OR LUNG: ICD-10-CM

## 2020-02-27 DIAGNOSIS — I25.10 ATHEROSCLEROTIC HEART DISEASE OF NATIVE CORONARY ARTERY W/OUT ANGINA PECTORIS: ICD-10-CM

## 2020-02-27 DIAGNOSIS — Z96.41 PRESENCE OF INSULIN PUMP (EXTERNAL) (INTERNAL): ICD-10-CM

## 2020-02-27 DIAGNOSIS — E83.42 HYPOMAGNESEMIA: ICD-10-CM

## 2020-02-27 DIAGNOSIS — I25.10 ATHEROSCLEROTIC HEART DISEASE OF NATIVE CORONARY ARTERY WITHOUT ANGINA PECTORIS: ICD-10-CM

## 2020-02-27 DIAGNOSIS — Z89.511 ACQUIRED ABSENCE OF RIGHT LEG BELOW KNEE: ICD-10-CM

## 2020-02-27 DIAGNOSIS — E78.5 HYPERLIPIDEMIA, UNSPECIFIED: ICD-10-CM

## 2020-02-27 DIAGNOSIS — J43.9 EMPHYSEMA, UNSPECIFIED: ICD-10-CM

## 2020-02-27 DIAGNOSIS — I21.4 NON-ST ELEVATION (NSTEMI) MYOCARDIAL INFARCTION: ICD-10-CM

## 2020-02-27 DIAGNOSIS — N18.9 CHRONIC KIDNEY DISEASE, UNSPECIFIED: ICD-10-CM

## 2020-02-27 DIAGNOSIS — J44.9 CHRONIC OBSTRUCTIVE PULMONARY DISEASE, UNSPECIFIED: ICD-10-CM

## 2020-02-27 DIAGNOSIS — I50.32 CHRONIC DIASTOLIC (CONGESTIVE) HEART FAILURE: ICD-10-CM

## 2020-02-27 DIAGNOSIS — Z79.02 LONG TERM (CURRENT) USE OF ANTITHROMBOTICS/ANTIPLATELETS: ICD-10-CM

## 2020-02-27 PROCEDURE — 99203 OFFICE O/P NEW LOW 30 MIN: CPT

## 2020-02-27 RX ORDER — ALBUTEROL SULFATE 90 UG/1
108 (90 BASE) AEROSOL, METERED RESPIRATORY (INHALATION)
Refills: 0 | Status: ACTIVE | COMMUNITY

## 2020-02-27 RX ORDER — METOPROLOL TARTRATE 25 MG/1
25 TABLET, FILM COATED ORAL
Refills: 0 | Status: ACTIVE | COMMUNITY

## 2020-02-27 RX ORDER — FAMOTIDINE 10 MG/1
TABLET, FILM COATED ORAL
Refills: 0 | Status: ACTIVE | COMMUNITY

## 2020-02-27 RX ORDER — CLOPIDOGREL 75 MG/1
75 TABLET, FILM COATED ORAL
Refills: 0 | Status: ACTIVE | COMMUNITY

## 2020-02-27 RX ORDER — AMLODIPINE BESYLATE 10 MG/1
10 TABLET ORAL
Refills: 0 | Status: ACTIVE | COMMUNITY

## 2020-02-27 RX ORDER — ASPIRIN 81 MG
81 TABLET, DELAYED RELEASE (ENTERIC COATED) ORAL
Refills: 0 | Status: ACTIVE | COMMUNITY

## 2020-02-27 RX ORDER — UMECLIDINIUM BROMIDE AND VILANTEROL TRIFENATATE 62.5; 25 UG/1; UG/1
62.5-25 POWDER RESPIRATORY (INHALATION)
Refills: 0 | Status: ACTIVE | COMMUNITY

## 2020-02-27 RX ORDER — LISINOPRIL 5 MG/1
5 TABLET ORAL
Refills: 0 | Status: ACTIVE | COMMUNITY

## 2020-02-27 RX ORDER — ISOSORBIDE MONONITRATE 60 MG/1
60 TABLET, EXTENDED RELEASE ORAL
Refills: 0 | Status: ACTIVE | COMMUNITY
Start: 2020-02-27

## 2020-02-27 RX ORDER — ATORVASTATIN CALCIUM 80 MG/1
80 TABLET, FILM COATED ORAL
Refills: 0 | Status: ACTIVE | COMMUNITY
Start: 2020-02-27

## 2020-03-02 NOTE — HISTORY OF PRESENT ILLNESS
[TextBox_4] : 79 year old male with current smoker (1-2 PPD x 67 yrs) with PMHx of COPD, PVS, diastolic CHF, CAD s/p PCI 2005, DMII with recent hospitalization at [facility] from 2/12/20-2/19/20. Patient was tx from [place] where he was initially admitted for dyspnea, diagnosed with pneumonia and RML lung mass. He suffered NSTEMI , LHC revealed 70% LAD stenosis and was tx to [facility] for further management with plan  for MIDCAB at [facility] after lung mass eval. 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 Patient discharged w/ instructions to follow Dr. Go (cardiology) and pulmonary.\par \par Presents for follow up - here with daughter (Lauren). Per daughter, he has been feeling more short of breath the last few days and she's noted some confusion. Can only walk a few steps mostly limited by knee and hip pain + amputation. Last time he was able to ambulate easily was 2006. Has been using motorized vehicle since 2016 (s/p R BKA). Has to do self transfers for ADLs (ie shower), has no difficulty with transfers. Does not sleep through the night, has 5-6 night time awakenings 2/2 anxiety, not due to dyspnea. Coughs only once in a while (not daily), nonproductive, no wheezing, no hemoptysis, no chest pain. He admits to slight decrease in appetite but did not notice weight loss. No palpitations or rapid heart rate. Has noticed some swelling of the left leg, increasing since hospitalization. Mostly spends the day with leg down/sitting, not elevated.\par Still smokes 1 ppd, uses Anoro daily, ventolin PRN and using on average 2x/week\par \par Discussed results of biopsy (lung cancer). Main treatment options include chemo/radiation as he is not likely a surgical candidate with significant CAD. \par \par AJCC:\par T2,N2 (based on PET CT likely multinodal N2) IIIA

## 2020-03-02 NOTE — REVIEW OF SYSTEMS
[Fatigue] : fatigue [Poor Appetite] : poor appetite [Arthralgias] : arthralgias [Chronic Pain] : chronic pain [Diabetes] : diabetes [Negative] : Psychiatric [SOB on Exertion] : sob on exertion [Edema] : edema [Nocturia] : nocturia [Recent Wt Gain (___ Lbs)] : ~T no recent weight gain [Recent Wt Loss (___ Lbs)] : ~T no recent weight loss [Chills] : no chills [Cough] : no cough [Hemoptysis] : no hemoptysis [Chest Tightness] : no chest tightness [Sputum] : no sputum [Dyspnea] : no dyspnea [Pleuritic Pain] : no pleuritic pain [Wheezing] : no wheezing [Chest Discomfort] : no chest discomfort [Orthopnea] : no orthopnea [Palpitations] : no palpitations [Abdominal Pain] : no abdominal pain [Nausea] : no nausea [Vomiting] : no vomiting [Dysphagia] : no dysphagia [Headache] : no headache [Dizziness] : no dizziness [Memory Loss] : no memory loss [TextBox_119] : possible confusion on and off very recently, questionable if pathological [TextBox_3] : felt slightly warm last night

## 2020-03-02 NOTE — PROCEDURE
[FreeTextEntry1] : PET scan 2/13/20\par Impression: \par \par FDG avid right hilar mass corresponding to findings on recent CT chest suspicious for malignancy. FDG avid right lower paratracheal and prevascular lymph nodes suspicious for malignancy. \par \par FDG avid bilateral hilar lymph nodes which are in a pattern typical of inflammatory/infectious etiology although given the presence of a right hilar mass neoplasm cannot be excluded.\par \par Focal uptake within the right palatine tonsil which is nonspecific, correlation with direct visualization is recommended.

## 2020-03-02 NOTE — ASSESSMENT
[FreeTextEntry1] : 80 yo M with CAD found incidentally to have squamous cell lung carcinoma of RUL, likely [id number] (multinodal N2 as per PET, at least IIIA, ), poor surgical candidate given untreated mid LAD lesion\par \par Diagnosis and management discussed with patient and daughter at length. Referral for Dr. Max and Dr. Dubose given.\par \par Patient is hypoxic in the office with fluctuating  SpO2 on room air at rest at 82%, will need home oxygen (home concentrator and portable concentrator), but still smoking 1pack cig/day. Educated about smoking cessation prior to oxygen. Nicotine patch started yesterday, prescribed gum.\par We'll contact cardiology re: management of CAD concomitantly with cancer treatment\par Patient's dyspnea is likely multifactorial 2/2 lung ca/COPD and  cardiac disease. Will continue inhalers for COPD, smoking cessation, ca treatment and supplemental oxygen in addition to optimization of hear disease.\par \par Significant polypharmacy and multiple medication regimens noted today.  He has been taking his home medications + the discharge medications (carvedilol + metoprolol, lisinopril + quinapril, norvasc + amlodipine). It is not clear exactly which meds he is taking, but BP noted to be low (100/60) and bradycardic (58). This may be contributing to SOB and confusion. Will d/c norvasc 10mg, carvedilol 25mg, simvastatin 20mg, pantoprazole 40mg, quinapril 40mg, and chlorthalidone 25mg. Continue discharge medications per d/c paperwork (Isosorbide 60mg, lipitor 80mg, lisinopril 5mg, metoprolol 25mg, ASA, clopidogrel, famotidine 20mg, amlodipine). Urgent follow up with cardiology.

## 2020-03-02 NOTE — PHYSICAL EXAM
[No Acute Distress] : no acute distress [Well Nourished] : well nourished [No Deformities] : no deformities [Normal Oropharynx] : normal oropharynx [Normal Appearance] : normal appearance [No Neck Mass] : no neck mass [No Resp Distress] : no resp distress [Oriented x3] : oriented x3 [Normal Affect] : normal affect [TextBox_44] : no lymphadenopathy [TextBox_105] : s/p R GTA

## 2020-03-16 ENCOUNTER — APPOINTMENT (OUTPATIENT)
Dept: RADIATION ONCOLOGY | Facility: CLINIC | Age: 80
End: 2020-03-16

## 2020-03-19 LAB
CULTURE RESULTS: SIGNIFICANT CHANGE UP
SPECIMEN SOURCE: SIGNIFICANT CHANGE UP

## 2020-04-08 LAB
CULTURE RESULTS: SIGNIFICANT CHANGE UP
SPECIMEN SOURCE: SIGNIFICANT CHANGE UP

## 2022-11-10 NOTE — PROGRESS NOTE ADULT - PROBLEM SELECTOR PLAN 3
- Holding beta blocker for 5 second pause overnight  - Continue to monitor tele and BP  - Continue Imdur Vtama Pregnancy And Lactation Text: It is unknown if this medication can cause problems during pregnancy and breastfeeding.

## 2022-12-22 ENCOUNTER — NON-APPOINTMENT (OUTPATIENT)
Age: 82
End: 2022-12-22